# Patient Record
Sex: FEMALE | Race: BLACK OR AFRICAN AMERICAN | NOT HISPANIC OR LATINO | Employment: OTHER | URBAN - METROPOLITAN AREA
[De-identification: names, ages, dates, MRNs, and addresses within clinical notes are randomized per-mention and may not be internally consistent; named-entity substitution may affect disease eponyms.]

---

## 2017-01-10 ENCOUNTER — ALLSCRIPTS OFFICE VISIT (OUTPATIENT)
Dept: OTHER | Facility: OTHER | Age: 42
End: 2017-01-10

## 2017-01-11 ENCOUNTER — APPOINTMENT (OUTPATIENT)
Dept: LAB | Facility: HOSPITAL | Age: 42
End: 2017-01-11
Payer: COMMERCIAL

## 2017-01-11 DIAGNOSIS — N91.2 AMENORRHEA: ICD-10-CM

## 2017-01-11 LAB — HCG SERPL QL: NEGATIVE

## 2017-01-11 PROCEDURE — 84703 CHORIONIC GONADOTROPIN ASSAY: CPT

## 2017-01-11 PROCEDURE — 36415 COLL VENOUS BLD VENIPUNCTURE: CPT

## 2017-01-23 ENCOUNTER — HOSPITAL ENCOUNTER (EMERGENCY)
Facility: HOSPITAL | Age: 42
Discharge: HOME | End: 2017-01-23
Attending: EMERGENCY MEDICINE | Admitting: EMERGENCY MEDICINE
Payer: COMMERCIAL

## 2017-01-23 ENCOUNTER — APPOINTMENT (EMERGENCY)
Dept: CT IMAGING | Facility: HOSPITAL | Age: 42
End: 2017-01-23
Payer: COMMERCIAL

## 2017-01-23 VITALS
DIASTOLIC BLOOD PRESSURE: 87 MMHG | WEIGHT: 250.66 LBS | OXYGEN SATURATION: 100 % | SYSTOLIC BLOOD PRESSURE: 176 MMHG | RESPIRATION RATE: 18 BRPM | BODY MASS INDEX: 38.11 KG/M2 | TEMPERATURE: 98.2 F | HEART RATE: 72 BPM

## 2017-01-23 DIAGNOSIS — S16.1XXA NECK STRAIN: ICD-10-CM

## 2017-01-23 DIAGNOSIS — R51.9 HEADACHE: Primary | ICD-10-CM

## 2017-01-23 LAB
ALBUMIN SERPL BCP-MCNC: 3.4 G/DL (ref 3.5–5)
ALP SERPL-CCNC: 97 U/L (ref 46–116)
ALT SERPL W P-5'-P-CCNC: 34 U/L (ref 12–78)
AMPHETAMINES SERPL QL SCN: NEGATIVE
ANION GAP SERPL CALCULATED.3IONS-SCNC: 7 MMOL/L (ref 4–13)
APTT PPP: 32 SECONDS (ref 24–36)
AST SERPL W P-5'-P-CCNC: 17 U/L (ref 5–45)
ATRIAL RATE: 68 BPM
BACTERIA UR QL AUTO: ABNORMAL /HPF
BARBITURATES UR QL: NEGATIVE
BASOPHILS # BLD AUTO: 0.05 THOUSANDS/ΜL (ref 0–0.1)
BASOPHILS NFR BLD AUTO: 1 % (ref 0–1)
BENZODIAZ UR QL: NEGATIVE
BILIRUB SERPL-MCNC: 0.16 MG/DL (ref 0.2–1)
BILIRUB UR QL STRIP: NEGATIVE
BUN SERPL-MCNC: 8 MG/DL (ref 5–25)
CALCIUM SERPL-MCNC: 8.7 MG/DL (ref 8.3–10.1)
CHLORIDE SERPL-SCNC: 109 MMOL/L (ref 100–108)
CLARITY UR: CLEAR
CO2 SERPL-SCNC: 24 MMOL/L (ref 21–32)
COCAINE UR QL: NEGATIVE
COLOR UR: ABNORMAL
CREAT SERPL-MCNC: 0.74 MG/DL (ref 0.6–1.3)
EOSINOPHIL # BLD AUTO: 0.17 THOUSAND/ΜL (ref 0–0.61)
EOSINOPHIL NFR BLD AUTO: 4 % (ref 0–6)
ERYTHROCYTE [DISTWIDTH] IN BLOOD BY AUTOMATED COUNT: 21 % (ref 11.6–15.1)
ETHANOL EXG-MCNC: 0 MG/DL
GFR SERPL CREATININE-BSD FRML MDRD: >60 ML/MIN/1.73SQ M
GLUCOSE SERPL-MCNC: 100 MG/DL (ref 65–140)
GLUCOSE UR STRIP-MCNC: NEGATIVE MG/DL
HCG UR QL: NEGATIVE
HCT VFR BLD AUTO: 28.9 % (ref 34.8–46.1)
HGB BLD-MCNC: 8.1 G/DL (ref 11.5–15.4)
HGB UR QL STRIP.AUTO: ABNORMAL
INR PPP: 1.08 (ref 0.86–1.16)
KETONES UR STRIP-MCNC: NEGATIVE MG/DL
LEUKOCYTE ESTERASE UR QL STRIP: NEGATIVE
LYMPHOCYTES # BLD AUTO: 1.8 THOUSANDS/ΜL (ref 0.6–4.47)
LYMPHOCYTES NFR BLD AUTO: 40 % (ref 14–44)
MCH RBC QN AUTO: 19.3 PG (ref 26.8–34.3)
MCHC RBC AUTO-ENTMCNC: 28 G/DL (ref 31.4–37.4)
MCV RBC AUTO: 69 FL (ref 82–98)
METHADONE UR QL: NEGATIVE
MONOCYTES # BLD AUTO: 0.39 THOUSAND/ΜL (ref 0.17–1.22)
MONOCYTES NFR BLD AUTO: 9 % (ref 4–12)
NEUTROPHILS # BLD AUTO: 2.06 THOUSANDS/ΜL (ref 1.85–7.62)
NEUTS SEG NFR BLD AUTO: 46 % (ref 43–75)
NITRITE UR QL STRIP: NEGATIVE
NON-SQ EPI CELLS URNS QL MICRO: ABNORMAL /HPF
OPIATES UR QL SCN: NEGATIVE
P AXIS: 27 DEGREES
PCP UR QL: NEGATIVE
PH UR STRIP.AUTO: 7 [PH] (ref 4.5–8)
PLATELET # BLD AUTO: 654 THOUSANDS/UL (ref 149–390)
PMV BLD AUTO: 10.7 FL (ref 8.9–12.7)
POTASSIUM SERPL-SCNC: 3.7 MMOL/L (ref 3.5–5.3)
PR INTERVAL: 216 MS
PROT SERPL-MCNC: 7.3 G/DL (ref 6.4–8.2)
PROT UR STRIP-MCNC: NEGATIVE MG/DL
PROTHROMBIN TIME: 14 SECONDS (ref 12–14.3)
QRS AXIS: 53 DEGREES
QRSD INTERVAL: 84 MS
QT INTERVAL: 384 MS
QTC INTERVAL: 408 MS
RBC # BLD AUTO: 4.19 MILLION/UL (ref 3.81–5.12)
RBC #/AREA URNS AUTO: ABNORMAL /HPF
SODIUM SERPL-SCNC: 140 MMOL/L (ref 136–145)
SP GR UR STRIP.AUTO: 1.01 (ref 1–1.03)
SPECIMEN SOURCE: NORMAL
T WAVE AXIS: 3 DEGREES
THC UR QL: POSITIVE
TROPONIN I BLD-MCNC: 0.01 NG/ML (ref 0–0.08)
UROBILINOGEN UR QL STRIP.AUTO: 1 E.U./DL
VENTRICULAR RATE: 68 BPM
WBC # BLD AUTO: 4.47 THOUSAND/UL (ref 4.31–10.16)
WBC #/AREA URNS AUTO: ABNORMAL /HPF

## 2017-01-23 PROCEDURE — 80053 COMPREHEN METABOLIC PANEL: CPT | Performed by: EMERGENCY MEDICINE

## 2017-01-23 PROCEDURE — 85730 THROMBOPLASTIN TIME PARTIAL: CPT | Performed by: EMERGENCY MEDICINE

## 2017-01-23 PROCEDURE — 81002 URINALYSIS NONAUTO W/O SCOPE: CPT | Performed by: EMERGENCY MEDICINE

## 2017-01-23 PROCEDURE — 85610 PROTHROMBIN TIME: CPT | Performed by: EMERGENCY MEDICINE

## 2017-01-23 PROCEDURE — 82075 ASSAY OF BREATH ETHANOL: CPT | Performed by: EMERGENCY MEDICINE

## 2017-01-23 PROCEDURE — 81025 URINE PREGNANCY TEST: CPT | Performed by: EMERGENCY MEDICINE

## 2017-01-23 PROCEDURE — 85025 COMPLETE CBC W/AUTO DIFF WBC: CPT | Performed by: EMERGENCY MEDICINE

## 2017-01-23 PROCEDURE — 96374 THER/PROPH/DIAG INJ IV PUSH: CPT

## 2017-01-23 PROCEDURE — 93005 ELECTROCARDIOGRAM TRACING: CPT | Performed by: EMERGENCY MEDICINE

## 2017-01-23 PROCEDURE — 80307 DRUG TEST PRSMV CHEM ANLYZR: CPT | Performed by: EMERGENCY MEDICINE

## 2017-01-23 PROCEDURE — 70496 CT ANGIOGRAPHY HEAD: CPT

## 2017-01-23 PROCEDURE — 36415 COLL VENOUS BLD VENIPUNCTURE: CPT | Performed by: EMERGENCY MEDICINE

## 2017-01-23 PROCEDURE — 70498 CT ANGIOGRAPHY NECK: CPT

## 2017-01-23 PROCEDURE — 84484 ASSAY OF TROPONIN QUANT: CPT

## 2017-01-23 PROCEDURE — 87086 URINE CULTURE/COLONY COUNT: CPT

## 2017-01-23 PROCEDURE — 99284 EMERGENCY DEPT VISIT MOD MDM: CPT

## 2017-01-23 PROCEDURE — 81001 URINALYSIS AUTO W/SCOPE: CPT

## 2017-01-23 RX ORDER — TIZANIDINE HYDROCHLORIDE 4 MG/1
4 CAPSULE, GELATIN COATED ORAL
COMMUNITY

## 2017-01-23 RX ORDER — DIPHENHYDRAMINE HYDROCHLORIDE 50 MG/ML
50 INJECTION INTRAMUSCULAR; INTRAVENOUS ONCE
Status: COMPLETED | OUTPATIENT
Start: 2017-01-23 | End: 2017-01-23

## 2017-01-23 RX ADMIN — IOHEXOL 85 ML: 350 INJECTION, SOLUTION INTRAVENOUS at 16:09

## 2017-01-23 RX ADMIN — DIPHENHYDRAMINE HYDROCHLORIDE 50 MG: 50 INJECTION, SOLUTION INTRAMUSCULAR; INTRAVENOUS at 15:44

## 2017-01-24 LAB — BACTERIA UR CULT: NORMAL

## 2017-01-27 ENCOUNTER — ALLSCRIPTS OFFICE VISIT (OUTPATIENT)
Dept: OTHER | Facility: OTHER | Age: 42
End: 2017-01-27

## 2017-01-27 DIAGNOSIS — D50.9 IRON DEFICIENCY ANEMIA: ICD-10-CM

## 2017-01-27 DIAGNOSIS — N93.8 OTHER SPECIFIED ABNORMAL UTERINE AND VAGINAL BLEEDING: ICD-10-CM

## 2017-01-27 DIAGNOSIS — Z12.31 ENCOUNTER FOR SCREENING MAMMOGRAM FOR MALIGNANT NEOPLASM OF BREAST: ICD-10-CM

## 2017-01-27 DIAGNOSIS — N92.0 EXCESSIVE AND FREQUENT MENSTRUATION WITH REGULAR CYCLE: ICD-10-CM

## 2017-01-27 DIAGNOSIS — F10.10 UNCOMPLICATED ALCOHOL ABUSE: ICD-10-CM

## 2017-01-27 DIAGNOSIS — E53.8 DEFICIENCY OF OTHER SPECIFIED B GROUP VITAMINS: ICD-10-CM

## 2017-01-27 DIAGNOSIS — G35 MULTIPLE SCLEROSIS (HCC): ICD-10-CM

## 2017-01-30 RX ORDER — CYANOCOBALAMIN 1000 UG/ML
1000 INJECTION INTRAMUSCULAR; SUBCUTANEOUS ONCE
Status: DISCONTINUED | OUTPATIENT
Start: 2017-01-31 | End: 2017-02-01

## 2017-01-30 RX ORDER — SODIUM CHLORIDE 9 MG/ML
20 INJECTION, SOLUTION INTRAVENOUS ONCE
Status: DISCONTINUED | OUTPATIENT
Start: 2017-01-31 | End: 2017-02-01

## 2017-01-31 ENCOUNTER — HOSPITAL ENCOUNTER (OUTPATIENT)
Dept: INFUSION CENTER | Facility: HOSPITAL | Age: 42
Discharge: HOME | End: 2017-01-31
Payer: COMMERCIAL

## 2017-02-01 ENCOUNTER — HOSPITAL ENCOUNTER (OUTPATIENT)
Dept: INFUSION CENTER | Facility: HOSPITAL | Age: 42
Discharge: HOME | End: 2017-02-01
Payer: COMMERCIAL

## 2017-02-01 VITALS
WEIGHT: 246.03 LBS | DIASTOLIC BLOOD PRESSURE: 62 MMHG | TEMPERATURE: 96.7 F | SYSTOLIC BLOOD PRESSURE: 118 MMHG | HEART RATE: 72 BPM | BODY MASS INDEX: 36.44 KG/M2 | RESPIRATION RATE: 20 BRPM | HEIGHT: 69 IN

## 2017-02-01 PROCEDURE — 96365 THER/PROPH/DIAG IV INF INIT: CPT

## 2017-02-01 PROCEDURE — 96366 THER/PROPH/DIAG IV INF ADDON: CPT

## 2017-02-01 PROCEDURE — 96372 THER/PROPH/DIAG INJ SC/IM: CPT

## 2017-02-01 RX ORDER — SODIUM CHLORIDE 9 MG/ML
20 INJECTION, SOLUTION INTRAVENOUS ONCE
Status: COMPLETED | OUTPATIENT
Start: 2017-02-01 | End: 2017-02-01

## 2017-02-01 RX ORDER — CYANOCOBALAMIN 1000 UG/ML
1000 INJECTION INTRAMUSCULAR; SUBCUTANEOUS ONCE
Status: COMPLETED | OUTPATIENT
Start: 2017-02-01 | End: 2017-02-01

## 2017-02-01 RX ORDER — METHOCARBAMOL 750 MG/1
750 TABLET, FILM COATED ORAL 3 TIMES DAILY
COMMUNITY

## 2017-02-01 RX ADMIN — SODIUM CHLORIDE 20 ML/HR: 0.9 INJECTION, SOLUTION INTRAVENOUS at 10:19

## 2017-02-01 RX ADMIN — IRON SUCROSE 300 MG: 20 INJECTION, SOLUTION INTRAVENOUS at 09:50

## 2017-02-01 RX ADMIN — CYANOCOBALAMIN 1000 MCG: 1000 INJECTION, SOLUTION INTRAMUSCULAR at 10:24

## 2017-02-01 NOTE — PLAN OF CARE
Problem: Potential for Falls  Goal: Patient will remain free of falls  INTERVENTIONS:  - Assess patient frequently for physical needs  - Identify cognitive and physical deficits and behaviors that affect risk of falls    - [place] fall precautions as indicated by assessment   - Educate patient/family on patient safety including physical limitations  - Instruct patient to call for assistance with activity based on assessment  - Modify environment to reduce risk of injury  - Consider OT/PT consult to assist with strengthening/mobility   Outcome: Progressing

## 2017-02-03 ENCOUNTER — ALLSCRIPTS OFFICE VISIT (OUTPATIENT)
Dept: OTHER | Facility: OTHER | Age: 42
End: 2017-02-03

## 2017-02-06 ENCOUNTER — ALLSCRIPTS OFFICE VISIT (OUTPATIENT)
Dept: OTHER | Facility: OTHER | Age: 42
End: 2017-02-06

## 2017-02-06 PROCEDURE — 87624 HPV HI-RISK TYP POOLED RSLT: CPT | Performed by: NURSE PRACTITIONER

## 2017-02-06 PROCEDURE — G0145 SCR C/V CYTO,THINLAYER,RESCR: HCPCS | Performed by: NURSE PRACTITIONER

## 2017-02-06 RX ORDER — SODIUM CHLORIDE 9 MG/ML
20 INJECTION, SOLUTION INTRAVENOUS ONCE
Status: COMPLETED | OUTPATIENT
Start: 2017-02-07 | End: 2017-02-07

## 2017-02-06 RX ORDER — CYANOCOBALAMIN 1000 UG/ML
1000 INJECTION INTRAMUSCULAR; SUBCUTANEOUS ONCE
Status: COMPLETED | OUTPATIENT
Start: 2017-02-07 | End: 2017-02-07

## 2017-02-07 ENCOUNTER — LAB REQUISITION (OUTPATIENT)
Dept: LAB | Facility: HOSPITAL | Age: 42
End: 2017-02-07
Payer: COMMERCIAL

## 2017-02-07 ENCOUNTER — HOSPITAL ENCOUNTER (OUTPATIENT)
Dept: INFUSION CENTER | Facility: HOSPITAL | Age: 42
Discharge: HOME | End: 2017-02-07
Payer: COMMERCIAL

## 2017-02-07 VITALS
DIASTOLIC BLOOD PRESSURE: 72 MMHG | RESPIRATION RATE: 18 BRPM | HEART RATE: 78 BPM | SYSTOLIC BLOOD PRESSURE: 118 MMHG | TEMPERATURE: 98.9 F

## 2017-02-07 DIAGNOSIS — Z11.51 ENCOUNTER FOR SCREENING FOR HUMAN PAPILLOMAVIRUS (HPV): ICD-10-CM

## 2017-02-07 DIAGNOSIS — Z77.21 CONTACT WITH AND (SUSPECTED) EXPOSURE TO POTENTIALLY HAZARDOUS BODY FLUIDS: ICD-10-CM

## 2017-02-07 DIAGNOSIS — Z01.419 ENCOUNTER FOR GYNECOLOGICAL EXAMINATION WITHOUT ABNORMAL FINDING: ICD-10-CM

## 2017-02-07 DIAGNOSIS — Z11.3 ENCOUNTER FOR SCREENING FOR INFECTIONS WITH PREDOMINANTLY SEXUAL MODE OF TRANSMISSION: ICD-10-CM

## 2017-02-07 PROCEDURE — 96372 THER/PROPH/DIAG INJ SC/IM: CPT

## 2017-02-07 PROCEDURE — 87491 CHLMYD TRACH DNA AMP PROBE: CPT | Performed by: NURSE PRACTITIONER

## 2017-02-07 PROCEDURE — 96365 THER/PROPH/DIAG IV INF INIT: CPT

## 2017-02-07 PROCEDURE — 87591 N.GONORRHOEAE DNA AMP PROB: CPT | Performed by: NURSE PRACTITIONER

## 2017-02-07 PROCEDURE — 96366 THER/PROPH/DIAG IV INF ADDON: CPT

## 2017-02-07 RX ADMIN — SODIUM CHLORIDE 20 ML/HR: 0.9 INJECTION, SOLUTION INTRAVENOUS at 13:30

## 2017-02-07 RX ADMIN — CYANOCOBALAMIN 1000 MCG: 1000 INJECTION, SOLUTION INTRAMUSCULAR at 15:41

## 2017-02-07 RX ADMIN — IRON SUCROSE 300 MG: 20 INJECTION, SOLUTION INTRAVENOUS at 13:33

## 2017-02-08 ENCOUNTER — TRANSCRIBE ORDERS (OUTPATIENT)
Dept: ADMINISTRATIVE | Facility: HOSPITAL | Age: 42
End: 2017-02-08

## 2017-02-08 ENCOUNTER — ALLSCRIPTS OFFICE VISIT (OUTPATIENT)
Dept: OTHER | Facility: OTHER | Age: 42
End: 2017-02-08

## 2017-02-08 DIAGNOSIS — R55 SYNCOPE AND COLLAPSE: ICD-10-CM

## 2017-02-08 DIAGNOSIS — G47.33 OBSTRUCTIVE SLEEP APNEA (ADULT) (PEDIATRIC): Primary | ICD-10-CM

## 2017-02-08 DIAGNOSIS — G35 MULTIPLE SCLEROSIS (HCC): ICD-10-CM

## 2017-02-08 LAB
CHLAMYDIA DNA CVX QL NAA+PROBE: NORMAL
N GONORRHOEA DNA GENITAL QL NAA+PROBE: NORMAL

## 2017-02-13 ENCOUNTER — LAB (OUTPATIENT)
Dept: LAB | Facility: HOSPITAL | Age: 42
End: 2017-02-13
Payer: COMMERCIAL

## 2017-02-13 DIAGNOSIS — I26.99 OTHER PULMONARY EMBOLISM WITHOUT ACUTE COR PULMONALE (HCC): ICD-10-CM

## 2017-02-13 DIAGNOSIS — N92.0 EXCESSIVE AND FREQUENT MENSTRUATION WITH REGULAR CYCLE: ICD-10-CM

## 2017-02-13 DIAGNOSIS — E53.8 DEFICIENCY OF OTHER SPECIFIED B GROUP VITAMINS: ICD-10-CM

## 2017-02-13 DIAGNOSIS — G35 MULTIPLE SCLEROSIS (HCC): ICD-10-CM

## 2017-02-13 LAB
BUN SERPL-MCNC: 10 MG/DL (ref 5–25)
CREAT SERPL-MCNC: 0.68 MG/DL (ref 0.6–1.3)
FOLATE SERPL-MCNC: 10.3 NG/ML (ref 3.1–17.5)
GFR SERPL CREATININE-BSD FRML MDRD: >60 ML/MIN/1.73SQ M
INR PPP: 1.6 (ref 0.86–1.16)
PROTHROMBIN TIME: 19 SECONDS (ref 12–14.3)
TSH SERPL DL<=0.05 MIU/L-ACNC: 0.57 UIU/ML (ref 0.36–3.74)
VIT B12 SERPL-MCNC: 598 PG/ML (ref 100–900)

## 2017-02-13 PROCEDURE — 82607 VITAMIN B-12: CPT

## 2017-02-13 PROCEDURE — 86235 NUCLEAR ANTIGEN ANTIBODY: CPT

## 2017-02-13 PROCEDURE — 82565 ASSAY OF CREATININE: CPT

## 2017-02-13 PROCEDURE — 84443 ASSAY THYROID STIM HORMONE: CPT

## 2017-02-13 PROCEDURE — 86618 LYME DISEASE ANTIBODY: CPT

## 2017-02-13 PROCEDURE — 82746 ASSAY OF FOLIC ACID SERUM: CPT

## 2017-02-13 PROCEDURE — 85610 PROTHROMBIN TIME: CPT

## 2017-02-13 PROCEDURE — 84520 ASSAY OF UREA NITROGEN: CPT

## 2017-02-13 PROCEDURE — 84207 ASSAY OF VITAMIN B-6: CPT

## 2017-02-13 PROCEDURE — 36415 COLL VENOUS BLD VENIPUNCTURE: CPT

## 2017-02-13 RX ORDER — CYANOCOBALAMIN 1000 UG/ML
1000 INJECTION INTRAMUSCULAR; SUBCUTANEOUS ONCE
Status: DISCONTINUED | OUTPATIENT
Start: 2017-02-14 | End: 2017-02-17 | Stop reason: HOSPADM

## 2017-02-13 RX ORDER — SODIUM CHLORIDE 9 MG/ML
20 INJECTION, SOLUTION INTRAVENOUS ONCE
Status: DISCONTINUED | OUTPATIENT
Start: 2017-02-14 | End: 2017-02-17 | Stop reason: HOSPADM

## 2017-02-14 ENCOUNTER — HOSPITAL ENCOUNTER (OUTPATIENT)
Dept: INFUSION CENTER | Facility: HOSPITAL | Age: 42
Discharge: HOME | End: 2017-02-14
Payer: COMMERCIAL

## 2017-02-14 LAB
ENA SS-A AB SER-ACNC: <0.2 AI (ref 0–0.9)
ENA SS-B AB SER-ACNC: <0.2 AI (ref 0–0.9)

## 2017-02-15 ENCOUNTER — HOSPITAL ENCOUNTER (OUTPATIENT)
Dept: RADIOLOGY | Facility: HOSPITAL | Age: 42
Discharge: HOME | End: 2017-02-15
Payer: COMMERCIAL

## 2017-02-15 DIAGNOSIS — N92.0 EXCESSIVE AND FREQUENT MENSTRUATION WITH REGULAR CYCLE: ICD-10-CM

## 2017-02-15 LAB
B BURGDOR IGG SER IA-ACNC: 0.36
B BURGDOR IGM SER IA-ACNC: 0.17
HPV RRNA GENITAL QL NAA+PROBE: ABNORMAL

## 2017-02-15 PROCEDURE — 76830 TRANSVAGINAL US NON-OB: CPT

## 2017-02-15 PROCEDURE — 76856 US EXAM PELVIC COMPLETE: CPT

## 2017-02-16 ENCOUNTER — GENERIC CONVERSION - ENCOUNTER (OUTPATIENT)
Dept: OTHER | Facility: OTHER | Age: 42
End: 2017-02-16

## 2017-02-16 ENCOUNTER — HOSPITAL ENCOUNTER (OUTPATIENT)
Dept: RADIOLOGY | Facility: HOSPITAL | Age: 42
End: 2017-02-16
Payer: COMMERCIAL

## 2017-02-16 LAB
LAB AP GYN PRIMARY INTERPRETATION: NORMAL
LAB AP LMP: NORMAL
Lab: NORMAL
VIT B6 SERPL-MCNC: 9.6 UG/L (ref 2–32.8)

## 2017-03-01 DIAGNOSIS — Z95.828 PRESENCE OF OTHER VASCULAR IMPLANTS AND GRAFTS: ICD-10-CM

## 2017-03-02 ENCOUNTER — TRANSCRIBE ORDERS (OUTPATIENT)
Dept: ADMINISTRATIVE | Facility: HOSPITAL | Age: 42
End: 2017-03-02

## 2017-03-02 DIAGNOSIS — I80.9 BLOOD CLOT ASSOCIATED WITH VEIN WALL INFLAMMATION: Primary | ICD-10-CM

## 2017-03-03 ENCOUNTER — GENERIC CONVERSION - ENCOUNTER (OUTPATIENT)
Dept: OTHER | Facility: OTHER | Age: 42
End: 2017-03-03

## 2017-03-08 ENCOUNTER — HOSPITAL ENCOUNTER (OUTPATIENT)
Dept: RADIOLOGY | Facility: HOSPITAL | Age: 42
Discharge: HOME | End: 2017-03-08
Payer: COMMERCIAL

## 2017-03-08 DIAGNOSIS — I80.9 BLOOD CLOT ASSOCIATED WITH VEIN WALL INFLAMMATION: ICD-10-CM

## 2017-03-08 PROCEDURE — 71250 CT THORAX DX C-: CPT

## 2017-03-08 PROCEDURE — 74150 CT ABDOMEN W/O CONTRAST: CPT

## 2017-03-10 ENCOUNTER — GENERIC CONVERSION - ENCOUNTER (OUTPATIENT)
Dept: OTHER | Facility: OTHER | Age: 42
End: 2017-03-10

## 2017-03-10 RX ORDER — SODIUM CHLORIDE 9 MG/ML
20 INJECTION, SOLUTION INTRAVENOUS ONCE
Status: COMPLETED | OUTPATIENT
Start: 2017-03-13 | End: 2017-03-13

## 2017-03-10 RX ORDER — CYANOCOBALAMIN 1000 UG/ML
1000 INJECTION INTRAMUSCULAR; SUBCUTANEOUS ONCE
Status: COMPLETED | OUTPATIENT
Start: 2017-03-13 | End: 2017-03-13

## 2017-03-13 ENCOUNTER — HOSPITAL ENCOUNTER (OUTPATIENT)
Dept: INFUSION CENTER | Facility: HOSPITAL | Age: 42
Discharge: HOME | End: 2017-03-13
Payer: COMMERCIAL

## 2017-03-13 VITALS
HEART RATE: 90 BPM | SYSTOLIC BLOOD PRESSURE: 104 MMHG | DIASTOLIC BLOOD PRESSURE: 62 MMHG | RESPIRATION RATE: 18 BRPM | TEMPERATURE: 97.4 F

## 2017-03-13 PROCEDURE — 96365 THER/PROPH/DIAG IV INF INIT: CPT

## 2017-03-13 PROCEDURE — 96372 THER/PROPH/DIAG INJ SC/IM: CPT

## 2017-03-13 PROCEDURE — 96366 THER/PROPH/DIAG IV INF ADDON: CPT

## 2017-03-13 RX ADMIN — CYANOCOBALAMIN 1000 MCG: 1000 INJECTION, SOLUTION INTRAMUSCULAR at 14:44

## 2017-03-13 RX ADMIN — SODIUM CHLORIDE 20 ML/HR: 0.9 INJECTION, SOLUTION INTRAVENOUS at 14:39

## 2017-03-13 RX ADMIN — IRON SUCROSE 300 MG: 20 INJECTION, SOLUTION INTRAVENOUS at 14:39

## 2017-03-13 NOTE — PLAN OF CARE
Problem: Potential for Falls  Goal: Patient will remain free of falls  INTERVENTIONS:  - Assess patient frequently for physical needs  - Identify cognitive and physical deficits and behaviors that affect risk of falls    - [place] fall precautions as indicated by assessment   - Educate patient/family on patient safety including physical limitations  - Instruct patient to call for assistance with activity based on assessment  - Modify environment to reduce risk of injury  - Consider OT/PT consult to assist with strengthening/mobility   Outcome: Progressing

## 2017-03-17 RX ORDER — SODIUM CHLORIDE 9 MG/ML
20 INJECTION, SOLUTION INTRAVENOUS ONCE
Status: COMPLETED | OUTPATIENT
Start: 2017-03-20 | End: 2017-03-20

## 2017-03-17 RX ORDER — CYANOCOBALAMIN 1000 UG/ML
1000 INJECTION INTRAMUSCULAR; SUBCUTANEOUS ONCE
Status: COMPLETED | OUTPATIENT
Start: 2017-03-20 | End: 2017-03-20

## 2017-03-20 ENCOUNTER — HOSPITAL ENCOUNTER (OUTPATIENT)
Dept: INFUSION CENTER | Facility: HOSPITAL | Age: 42
Discharge: HOME | End: 2017-03-20
Payer: COMMERCIAL

## 2017-03-20 ENCOUNTER — GENERIC CONVERSION - ENCOUNTER (OUTPATIENT)
Dept: OTHER | Facility: OTHER | Age: 42
End: 2017-03-20

## 2017-03-20 VITALS
SYSTOLIC BLOOD PRESSURE: 121 MMHG | TEMPERATURE: 98 F | RESPIRATION RATE: 18 BRPM | DIASTOLIC BLOOD PRESSURE: 71 MMHG | HEART RATE: 105 BPM

## 2017-03-20 PROCEDURE — 96366 THER/PROPH/DIAG IV INF ADDON: CPT

## 2017-03-20 PROCEDURE — 96365 THER/PROPH/DIAG IV INF INIT: CPT

## 2017-03-20 PROCEDURE — 96372 THER/PROPH/DIAG INJ SC/IM: CPT

## 2017-03-20 RX ORDER — PREGABALIN 50 MG/1
50 CAPSULE ORAL 3 TIMES DAILY
COMMUNITY

## 2017-03-20 RX ORDER — WARFARIN SODIUM 10 MG/1
15 TABLET ORAL
COMMUNITY

## 2017-03-20 RX ORDER — ESCITALOPRAM OXALATE 10 MG/1
10 TABLET ORAL DAILY
COMMUNITY

## 2017-03-20 RX ORDER — LANOLIN ALCOHOL/MO/W.PET/CERES
100 CREAM (GRAM) TOPICAL DAILY
COMMUNITY

## 2017-03-20 RX ADMIN — SODIUM CHLORIDE 20 ML/HR: 0.9 INJECTION, SOLUTION INTRAVENOUS at 13:19

## 2017-03-20 RX ADMIN — IRON SUCROSE 300 MG: 20 INJECTION, SOLUTION INTRAVENOUS at 13:39

## 2017-03-20 RX ADMIN — CYANOCOBALAMIN 1000 MCG: 1000 INJECTION, SOLUTION INTRAMUSCULAR at 13:18

## 2017-03-20 NOTE — PLAN OF CARE
Problem: Potential for Falls  Goal: Patient will remain free of falls  INTERVENTIONS:  - Assess patient frequently for physical needs  - Identify cognitive and physical deficits and behaviors that affect risk of falls    - [place] fall precautions as indicated by assessment   - Educate patient/family on patient safety including physical limitations  - Instruct patient to call for assistance with activity based on assessment  - Modify environment to reduce risk of injury  - Consider OT/PT consult to assist with strengthening/mobility   Outcome: Progressing

## 2017-05-11 ENCOUNTER — GENERIC CONVERSION - ENCOUNTER (OUTPATIENT)
Dept: OTHER | Facility: OTHER | Age: 42
End: 2017-05-11

## 2018-01-10 NOTE — MISCELLANEOUS
Provider Comments  Provider Comments:   Patient had second No show No call Neurology appointment  Tried to call patient to reschedule but got no answer  Sent second no show letter to address on file  Minerva GILES      Signatures   Electronically signed by : Ivone Silva;  Aug 31 2016 11:57AM EST                       (Author)

## 2018-01-10 NOTE — MISCELLANEOUS
Reason For Visit  Reason For Visit Free Text Note Form: F/U calls to pt an sister to assist with housing crisis  Pt may need to leave the hotel they are staying  Pt has met with [facility] who are trying to assist pt as able  Anita has provided pt with # for  resource near her with [facility]  Pt to call and try and schedule  Pt also requested her wheelchair to be delivered to the hotel room  SW did request [address] to deliver same this date as pt was having falls  SW was also able to help pt to get schedule pt with [facility] the organization who does Cara Hernandez evaluations  THis service ends tomorrow  She is scheduled at 8:00 am for tomorrow  Pt is aware  Pt relates she is also to see her PCP later that day   Pt to set up that transportation  Referral for [facility] also faxed in  Sw has also reviewed with pt shelter option as well as possible SNF placement since it had been recommended prior to DC  Pt has the [facility] who does direct admissions  Sw to continue to follow and assist as indicated  Active Problems    1  Asthma (493 90) (J45 909)   2  Benign essential hypertension (401 1) (I10)   3  Chronic GERD (530 81) (K21 9)   4  Constipation (564 00) (K59 00)   5  Deep venous thrombosis of distal lower extremity (453 42) (I82 4Z9)   6  Diabetes mellitus, type 2 (250 00) (E11 9)   7  Encounter for monitoring coumadin therapy (V58 83,V58 61) (Z51 81,Z79 01)   8  HIT (heparin-induced thrombocytopenia) (289 84) (D75 82)   9  Iron deficiency anemia (280 9) (D50 9)   10  Itching (698 9) (L29 9)   11  Migraine headache (346 90) (G43 909)   12  Morbid obesity (278 01) (E66 01)   13  Multiple sclerosis (340) (G35)   14  Neuropathic pain (729 2) (M79 2)   15  Peripheral neuropathy (356 9) (G62 9)   16  Pulmonary embolism (415 19) (I26 99)   17  Temporary low platelet count (389 4) (D69 6)   18  Urinary incontinence, unspecified type (788 30) (R32)   19  Vertigo (780 4) (R42)   20  Vitamin B12 deficiency (266 2) (E53 8)    Current Meds   1  Baclofen 20 MG Oral Tablet; TAKE 1 TAB PO QHS; Therapy: 12Apr2016 to (Evaluate:10Aug2016); Last Rx:12Apr2016 Ordered   2  Benadryl 25 MG Oral Tablet; TAKE 1 TABLET EVERY 4 TO 6 HOURS AS NEEDED; Therapy: 51BRC6676 to (Evaluate:51Hmc5472)  Requested for: 20MFL7704; Last   Rx:29Mar2016 Ordered   3  Butalbital-APAP-Caffeine -40 MG Oral Capsule; Therapy: 05Apr2016 to Recorded   4  [employer] Vitamin B-12 1000 MCG Oral Tablet; TAKE 1 TABLET DAILY AS DIRECTED; Therapy: 44XBM7010 to (Evaluate:15Oct2016)  Requested for: 29Mar2016; Last   Rx:29Mar2016 Ordered   5  Docusate Sodium 100 MG Oral Capsule; TAKE 1 CAPSULE DAILY; Therapy: 47WEQ7532 to (Evaluate:24Mar2017)  Requested for: 59SFP4020; Last   Rx:29Mar2016 Ordered   6  Ferrous Sulfate 325 (65 Fe) MG Oral Tablet; TAKE 1 TABLET 3 TIMES DAILY WITH FOOD; Therapy: 98RID9877 to ([facility])  Requested for: 71DGF1301; Last   Rx:29Mar2016 Ordered   7  Hydrochlorothiazide 12 5 MG Oral Tablet; TAKE 1 TABLET DAILY AS NEEDED; Therapy: 11YOM5240 to ([facility])  Requested for: 13HXK8337; Last   Rx:29Mar2016 Ordered   8  Magnesium Oxide 400 MG Oral Tablet; TAKE 1 TABLET 3 times daily; Therapy: 05Apr2016 to (Adalberto Young) Recorded   9  Meclizine HCl - 25 MG Oral Tablet; TAKE 1 TABLET 3-4 TIMES DAILY AS NEEDED; Therapy: 66WNN8627 to (Evaluate:75Jyi1253)  Requested for: 88OJX4643; Last   Rx:29Mar2016 Ordered   10  Nortriptyline HCl - 50 MG Oral Capsule; TAKE 1 CAPSULE AT BEDTIME; Therapy: 48JSZ0102 to Recorded   11  Omeprazole 40 MG Oral Capsule Delayed Release; TAKE 1 CAPSULE DAILY; Therapy: 46YFT4326 to (Evaluate:27Jun2016)  Requested for: 10RHY8435; Last    Rx:29Mar2016 Ordered   12  Thiamine HCl - 100 MG Oral Tablet; TAKE 1 TABLET DAILY; Therapy: 05Apr2016 to (Evaluate:82Btf8178) Recorded   13  TiZANidine HCl - 4 MG Oral Tablet; TAKE 1 TABLET AT BEDTIME;     Therapy: 14Apr2016 to (Evaluate:18Mvs9130); Last Rx:14Apr2016 Ordered   14  Topamax 50 MG Oral Tablet (Topiramate); TAKE 1 TABLET DAILY; Therapy: 98WUT8749 to (Charmayne Graven)  Requested for: 71RHA2554; Last    Rx:29Mar2016 Ordered   15  Ventolin  (90 Base) MCG/ACT Inhalation Aerosol Solution; INHALE 1 TO 2 PUFFS    EVERY 4 TO 6 HOURS AS NEEDED; Therapy: 03YOJ4056 to (Evaluate:90Ine1265)  Requested for: 95XDV2384; Last    Rx:29Mar2016 Ordered   16  Vitamin D3 2000 UNIT Oral Capsule; take 1 capsule by mouth once daily; Therapy: 05Apr2016 to Recorded   17  Warfarin Sodium 5 MG Oral Tablet; take as directed  take one and half tablet daily; Therapy: 80SCH4639 to (Last Rx:30Mar2016)  Requested for: 29NVH1909 Ordered    Allergies    1  Iodine External Tincture   2   Sulfa Drugs    Signatures   Electronically signed by : Maura Brown LCSW; Apr 19 2016  1:21PM EST                       (Author)

## 2018-01-10 NOTE — MISCELLANEOUS
Reason For Visit  Reason For Visit Free Text Note Form: SW spoke with pt via phone this date  Pt has been accepted at [facility] and will go there via Lorenzo block/nancy LANDEROS visit this date  SW will remain available to pt and assist as indicated  Active Problems    1  Asthma (493 90) (J45 909)   2  Benign essential hypertension (401 1) (I10)   3  Chronic GERD (530 81) (K21 9)   4  Constipation (564 00) (K59 00)   5  Deep venous thrombosis of distal lower extremity (453 42) (I82 4Z9)   6  Diabetes mellitus, type 2 (250 00) (E11 9)   7  Encounter for monitoring coumadin therapy (V58 83,V58 61) (Z51 81,Z79 01)   8  HIT (heparin-induced thrombocytopenia) (289 84) (D75 82)   9  Iron deficiency anemia (280 9) (D50 9)   10  Itching (698 9) (L29 9)   11  Migraine headache (346 90) (G43 909)   12  Morbid obesity (278 01) (E66 01)   13  Multiple sclerosis (340) (G35)   14  Neuropathic pain (729 2) (M79 2)   15  Peripheral neuropathy (356 9) (G62 9)   16  Pulmonary embolism (415 19) (I26 99)   17  Temporary low platelet count (238 7) (D69 6)   18  Urinary incontinence, unspecified type (788 30) (R32)   19  Vertigo (780 4) (R42)   20  Vitamin B12 deficiency (266 2) (E53 8)    Current Meds   1  Baclofen 20 MG Oral Tablet; TAKE 1 TAB PO QHS; Therapy: 70Wmz2972 to (Evaluate:95Lhs8422); Last Rx:12Apr2016 Ordered   2  Benadryl 25 MG Oral Tablet; TAKE 1 TABLET EVERY 4 TO 6 HOURS AS NEEDED; Therapy: 74BUY6826 to (Evaluate:22Hym3835)  Requested for: 30ULW1969; Last   Rx:29Mar2016 Ordered   3  Butalbital-APAP-Caffeine -40 MG Oral Capsule; Therapy: 19Kvv2738 to Recorded   4  [employer] Vitamin B-12 1000 MCG Oral Tablet; TAKE 1 TABLET DAILY AS DIRECTED; Therapy: 77KTW2810 to (Evaluate:15Oct2016)  Requested for: 29Mar2016; Last   Rx:29Mar2016 Ordered   5  DiphenhydrAMINE HCl - 50 MG Oral Capsule; take 1 capsule 1hr before contrast;   Therapy: 22Apr2016 to (Last Rx:22Apr2016) Ordered   6   Docusate Sodium 100 MG Oral Capsule; TAKE 1 CAPSULE DAILY; Therapy: 32OKS1022 to (Evaluate:24Mar2017)  Requested for: 09TEE3532; Last   Rx:29Mar2016 Ordered   7  Ferrous Sulfate 325 (65 Fe) MG Oral Tablet; TAKE 1 TABLET 3 TIMES DAILY WITH FOOD; Therapy: 14THW5072 to (Shea Albright)  Requested for: 34YXN5819; Last   Rx:29Mar2016 Ordered   8  Hydrochlorothiazide 12 5 MG Oral Tablet; TAKE 1 TABLET DAILY AS NEEDED; Therapy: 31NMY9413 to (Shea Albright)  Requested for: 45WNZ9790; Last   Rx:29Mar2016 Ordered   9  Magnesium Oxide 400 MG Oral Tablet; TAKE 1 TABLET 3 times daily; Therapy: 05Apr2016 to (Blaze Camargo) Recorded   10  Meclizine HCl - 25 MG Oral Tablet; TAKE 1 TABLET 3-4 TIMES DAILY AS NEEDED; Therapy: 83IZI0865 to (Evaluate:08Yfw8348)  Requested for: 78NFJ7613; Last    Rx:29Mar2016 Ordered   11  MethylPREDNISolone 32 MG Oral Tablet (Medrol); 32 mg po 12 hours prior to MRI, 32mg    PO 2 hours Prior to MRI; Therapy: 26Izf4320 to (Last Rx:22Apr2016) Ordered   12  Nortriptyline HCl - 50 MG Oral Capsule; TAKE 1 CAPSULE AT BEDTIME; Therapy: 39NYG8091 to Recorded   13  Omeprazole 40 MG Oral Capsule Delayed Release; TAKE 1 CAPSULE DAILY; Therapy: 11TAU7925 to (Evaluate:27Jun2016)  Requested for: 29NXN7714; Last    Rx:29Mar2016 Ordered   14  Thiamine HCl - 100 MG Oral Tablet; TAKE 1 TABLET DAILY; Therapy: 05Apr2016 to (Evaluate:50Bxl3644) Recorded   15  TiZANidine HCl - 4 MG Oral Tablet; TAKE 1 TABLET AT BEDTIME; Therapy: 73Vgt5490 to (Evaluate:78Cty8728); Last Rx:14Apr2016 Ordered   16  Topamax 50 MG Oral Tablet (Topiramate); TAKE 1 TABLET DAILY; Therapy: 52GOW2653 to (Shea Albright)  Requested for: 50YKC7393; Last    Rx:29Mar2016 Ordered   17  Ventolin  (90 Base) MCG/ACT Inhalation Aerosol Solution; INHALE 1 TO 2 PUFFS    EVERY 4 TO 6 HOURS AS NEEDED; Therapy: 59KNH2541 to (Evaluate:92Ziq9342)  Requested for: 78DOW6691; Last    Rx:29Mar2016 Ordered   18   Vitamin D3 2000 UNIT Oral Capsule; take 1 capsule by mouth once daily; Therapy: 92SJY3044 to Recorded   19  Warfarin Sodium 5 MG Oral Tablet; take as directed  take one and half tablet daily; Therapy: 38AHV8173 to (Last Rx:30Mar2016)  Requested for: 80EVD4340 Ordered    Allergies    1  Iodine External Tincture   2   Sulfa Drugs    Signatures   Electronically signed by : Siena Paris LCSW; Apr 27 2016 12:32PM EST                       (Author)

## 2018-01-11 NOTE — PROGRESS NOTES
History of Present Illness  Care Coordination Encounter Information:   Type of Encounter: Telephonic   Contact: Initial Contact   Last Office Visit: 3/29/16   Spoke to Patient  Care Coordination [facility] Nurse Lorena Learn:   The reason for call is to discuss outreach for follow up/needed services  Care Coordination call placed to [facility] for condition update  Hospitalized 4/4 - 4/5/16 for with weakness and fall secondary to new diagnosis of M S  in 2/2016  KYRA done and TCM visit was scheduled for 4/20/16 - but appointment with Dr Juanita Shaw was cancelled due to lack of transportation  [facility] reports that she did meet with Venessa Tamayo for the face-to-face application requirement on 4/20/16 but they could not provide transportation the office - approval "may take up to 3 weeks" according to patient  She has an upcoming appointment scheduled with Hem/Onc on Wednesday 4/27/16 and states she has no transportation  I did speak with ANGI Huang for [facility] and made her aware - she will inquire about a possibility of utilizing the [facility] reported that she is left the [employer] and is staying with a friend until 5/1/16  Neither the friend nor her sister can drive her as friend as 6 children and sister has started work with a new employer  She cannot afford taxi as her 2 dogs are being boarded at [facility] at $30 00/day  She is waiting to make appointment with Vestibular therapy and Pain Management once Venessa Tamayo is approved  And she mentioned that Pain Management requires her records from former provider in [place] that she says are not available  Renee Zimmerman also aware that [facility] with housing - despite her having a diagnosis of bipolar disorder, the next available appointment with them is not until September    [facility] did state that she is willing to go to a rehab facility at this time - does not have the contact number for [facility] admissions  [facility] will call patient to facilitate a potential admission to SNF as the patient agrees this will best address the issues of housing and transportation until a more permanent solution can be arranged  And she agrees that she needs physical therapy as she complains of neuropathic pain in hands (despite using bilateral hand braces) that prevents her from using the wheelchair or single cane independently  Stumaury Ganga verified that [facility] [address] visited Saturday and arranged her meds in a pill box  However, when she took her morning pills today, she noticed that Coumadin 15 mg was placed in both the morning and evening section and that she took 30 mg on Saturday and Sunday  The VNA is to visit today and draw a PT/INR - I will notify her PCP the possibility that the results may be elevated due to this issue  When asked about any s/s of bleeding - denies bruising but states she has had some vaginal spotting since Saturday  She reports that the open wound on the L shin has closed but both legs (L > R) remain moderately swollen (despite keeping her legs elevated) with a warm, bruised area mid shin  She also reports increased spasms on the Zanaflex (Baclofen made her feel dizzy)  Rafael Schuler was incontinent of a loose stool on Saturday (and Wednesday - watery) but had bowel control today  Stool remains "mushy" - advised to monitor for worsening s/s and report to PCP - patient understands and in agreement as she informed me that she did have C-diff in the past   Continues with sun sensitivity and migraines despite daily Topamax and Fioricet prn  No worsening but admitted that she was not taking the Topamax daily - the VNA did re-educate her when they arranged her pill box  She feels that her mild depression will subside when her situation remedies and wants to wait before trying a different med from Nortriptyline    I will forward this note to the PCP to advise him of all issues, including the Coumadin and today's PT/INR  I provided Orvan Prom with my contact information and advised her to notify the office of any medical issues  She verbalizes understanding and in agreement with plan  Active Problems    1  Asthma (493 90) (J45 909)   2  Benign essential hypertension (401 1) (I10)   3  Chronic GERD (530 81) (K21 9)   4  Constipation (564 00) (K59 00)   5  Deep venous thrombosis of distal lower extremity (453 42) (I82 4Z9)   6  Diabetes mellitus, type 2 (250 00) (E11 9)   7  Encounter for monitoring coumadin therapy (V58 83,V58 61) (Z51 81,Z79 01)   8  HIT (heparin-induced thrombocytopenia) (289 84) (D75 82)   9  Iron deficiency anemia (280 9) (D50 9)   10  Itching (698 9) (L29 9)   11  Migraine headache (346 90) (G43 909)   12  Morbid obesity (278 01) (E66 01)   13  Multiple sclerosis (340) (G35)   14  Neuropathic pain (729 2) (M79 2)   15  Peripheral neuropathy (356 9) (G62 9)   16  Pulmonary embolism (415 19) (I26 99)   17  Temporary low platelet count (312 5) (D69 6)   18  Urinary incontinence, unspecified type (788 30) (R32)   19  Vertigo (780 4) (R42)   20  Vitamin B12 deficiency (266 2) (E53 8)    Past Medical History    1  History of transient cerebral ischemia (V12 54) (Z86 73)    Surgical History    1  History of Gastric Surgery For Morbid Obesity Gastric Bypass   2  History of Inferior Vena Cava Filter Placement W/ Fluorosc Angiogr Guidance   3  History of Thrombolytics Tissue Plasminogen Activator    Family History  Mother    1  Family history of multiple sclerosis (V17 2) (Z82 0)  Father    2  Family history of cardiac disorder (V17 49) (Z82 49)    Social History    · Engages in binge consumption of alcohol (305 00) (F10 10)   · Former smoker (V15 82) (F18 092)   · Heavy alcohol use (305 00) (Z78 9)   · Lives with family   · Marijuana   · Uses marijuana (305 20) (F12 90)    Current Meds    1   Ventolin  (90 Base) MCG/ACT Inhalation Aerosol Solution; INHALE 1 TO 2 PUFFS   EVERY 4 TO 6 HOURS AS NEEDED; Therapy: 34DPH2339 to (Evaluate:31But7115)  Requested for: 52LFS6533; Last   Rx:29Mar2016 Ordered    2  Hydrochlorothiazide 12 5 MG Oral Tablet; TAKE 1 TABLET DAILY AS NEEDED; Therapy: 52MVS8844 to ([facility])  Requested for: 94DZS0712; Last   Rx:29Mar2016 Ordered    3  Omeprazole 40 MG Oral Capsule Delayed Release; TAKE 1 CAPSULE DAILY; Therapy: 20HLR4794 to (Evaluate:27Jun2016)  Requested for: 29PHZ5096; Last   Rx:29Mar2016 Ordered    4  Docusate Sodium 100 MG Oral Capsule; TAKE 1 CAPSULE DAILY; Therapy: 56HKU3279 to (Evaluate:24Mar2017)  Requested for: 82SPY5925; Last   Rx:29Mar2016 Ordered    5  Warfarin Sodium 5 MG Oral Tablet; take as directed  take one and half tablet daily; Therapy: 12VJY8178 to (Last Rx:30Mar2016)  Requested for: 56TBM4421 Ordered    6  Ferrous Sulfate 325 (65 Fe) MG Oral Tablet; TAKE 1 TABLET 3 TIMES DAILY WITH FOOD; Therapy: 60NBC9474 to ([facility])  Requested for: 47RTI1677; Last   Rx:29Mar2016 Ordered    7  Benadryl 25 MG Oral Tablet; TAKE 1 TABLET EVERY 4 TO 6 HOURS AS NEEDED; Therapy: 72CVB0776 to (Evaluate:10Gii6588)  Requested for: 39CHC6006; Last   Rx:29Mar2016 Ordered    8  Butalbital-APAP-Caffeine -40 MG Oral Capsule; Therapy: 55JOM1076 to Recorded   9  Magnesium Oxide 400 MG Oral Tablet; TAKE 1 TABLET 3 times daily; Therapy: 50Wgx8623 to ([facility]) Recorded   10  Nortriptyline HCl - 50 MG Oral Capsule; TAKE 1 CAPSULE AT BEDTIME; Therapy: 58SRM1070 to Recorded   11  Topamax 50 MG Oral Tablet (Topiramate); TAKE 1 TABLET DAILY; Therapy: 79YLL4082 to ([facility])  Requested for: 39BDG7663; Last    Rx:29Mar2016 Ordered    12  Baclofen 20 MG Oral Tablet; TAKE 1 TAB PO QHS; Therapy: 77Ket5543 to (Evaluate:10Aug2016); Last Rx:12Apr2016 Ordered   13  DiphenhydrAMINE HCl - 50 MG Oral Capsule; take 1 capsule 1hr before contrast;    Therapy: 22Apr2016 to (Last Rx:22Apr2016) Ordered   14   MethylPREDNISolone 32 MG Oral Tablet (Medrol); 32 mg po 12 hours prior to MRI, 32mg    PO 2 hours Prior to MRI; Therapy: 61Ipt5888 to (Last Rx:22Apr2016) Ordered   15  Thiamine HCl - 100 MG Oral Tablet; TAKE 1 TABLET DAILY; Therapy: 57Oep0905 to (Evaluate:71Oie1567) Recorded   16  TiZANidine HCl - 4 MG Oral Tablet; TAKE 1 TABLET AT BEDTIME; Therapy: 91Rqk0351 to (Evaluate:70Ouw8091); Last Rx:14Apr2016 Ordered   17  Vitamin D3 2000 UNIT Oral Capsule; take 1 capsule by mouth once daily; Therapy: 10CWC3107 to Recorded    18  Meclizine HCl - 25 MG Oral Tablet; TAKE 1 TABLET 3-4 TIMES DAILY AS NEEDED; Therapy: 00DAQ7857 to (Evaluate:66Pyc4952)  Requested for: 98MML2356; Last    Rx:29Mar2016 Ordered    19  [employer] Vitamin B-12 1000 MCG Oral Tablet; TAKE 1 TABLET DAILY AS DIRECTED; Therapy: 67XIG1640 to (Evaluate:15Oct2016)  Requested for: 29Mar2016; Last    Rx:29Mar2016 Ordered    Allergies    1  Iodine External Tincture   2  Sulfa Drugs    End of Encounter Meds    1  Ventolin  (90 Base) MCG/ACT Inhalation Aerosol Solution; INHALE 1 TO 2 PUFFS   EVERY 4 TO 6 HOURS AS NEEDED; Therapy: 70FCA0178 to (Evaluate:49Fwm7085)  Requested for: 75KTT8198; Last   Rx:29Mar2016 Ordered    2  Hydrochlorothiazide 12 5 MG Oral Tablet; TAKE 1 TABLET DAILY AS NEEDED; Therapy: 05CGW3252 to (Evan Bennett)  Requested for: 77MLN7387; Last   Rx:29Mar2016 Ordered    3  Omeprazole 40 MG Oral Capsule Delayed Release; TAKE 1 CAPSULE DAILY; Therapy: 58ZWK2196 to (Evaluate:27Jun2016)  Requested for: 89DZB9495; Last   Rx:29Mar2016 Ordered    4  Docusate Sodium 100 MG Oral Capsule; TAKE 1 CAPSULE DAILY; Therapy: 35UZB1640 to (Evaluate:24Mar2017)  Requested for: 07EEN4280; Last   Rx:29Mar2016 Ordered    5  Warfarin Sodium 5 MG Oral Tablet; take as directed  take one and half tablet daily; Therapy: 41ZAC8879 to (Last Rx:30Mar2016)  Requested for: 76JOA2688 Ordered    6   Ferrous Sulfate 325 (65 Fe) MG Oral Tablet; TAKE 1 TABLET 3 TIMES DAILY WITH FOOD; Therapy: 18KKT4546 to ([facility])  Requested for: 80PGH2434; Last   Rx:29Mar2016 Ordered    7  Benadryl 25 MG Oral Tablet; TAKE 1 TABLET EVERY 4 TO 6 HOURS AS NEEDED; Therapy: 95ZIL2807 to (Evaluate:66Ger8740)  Requested for: 63QEP5941; Last   Rx:29Mar2016 Ordered    8  Butalbital-APAP-Caffeine -40 MG Oral Capsule; Therapy: 68JFV1782 to Recorded   9  Magnesium Oxide 400 MG Oral Tablet; TAKE 1 TABLET 3 times daily; Therapy: 05Apr2016 to (Alicia Salgado) Recorded   10  Nortriptyline HCl - 50 MG Oral Capsule; TAKE 1 CAPSULE AT BEDTIME; Therapy: 48WTT5682 to Recorded   11  Topamax 50 MG Oral Tablet (Topiramate); TAKE 1 TABLET DAILY; Therapy: 81VDQ1771 to ([facility])  Requested for: 30AEJ0246; Last    Rx:29Mar2016 Ordered    12  Baclofen 20 MG Oral Tablet; TAKE 1 TAB PO QHS; Therapy: 74Deq8303 to (Evaluate:10Aug2016); Last Rx:16Jxq9371 Ordered   13  DiphenhydrAMINE HCl - 50 MG Oral Capsule; take 1 capsule 1hr before contrast;    Therapy: 22Apr2016 to (Last Rx:22Apr2016) Ordered   14  MethylPREDNISolone 32 MG Oral Tablet (Medrol); 32 mg po 12 hours prior to MRI, 32mg    PO 2 hours Prior to MRI; Therapy: 22Apr2016 to (Last Rx:22Apr2016) Ordered   15  Thiamine HCl - 100 MG Oral Tablet; TAKE 1 TABLET DAILY; Therapy: 05Apr2016 to (Evaluate:53Vbt5132) Recorded   16  TiZANidine HCl - 4 MG Oral Tablet; TAKE 1 TABLET AT BEDTIME; Therapy: 76Xfl5113 to (Evaluate:77Hlr4727); Last Rx:14Apr2016 Ordered   17  Vitamin D3 2000 UNIT Oral Capsule; take 1 capsule by mouth once daily; Therapy: 54BSZ6126 to Recorded    18  Meclizine HCl - 25 MG Oral Tablet; TAKE 1 TABLET 3-4 TIMES DAILY AS NEEDED; Therapy: 51AAN0518 to (Evaluate:44Bux7620)  Requested for: 32VSS0070; Last    Rx:29Mar2016 Ordered    19  [employer]S Vitamin B-12 1000 MCG Oral Tablet; TAKE 1 TABLET DAILY AS DIRECTED;     Therapy: 96SMG3240 to (Evaluate:15Oct2016)  Requested for: 90EVM5815; Last    Rx:29Mar2016 Ordered    Future Appointments    Date/Time Provider Specialty Site   04/27/2016 01:15 PM SANDRA Chatman  Hematology [facility]   05/24/2016 09:00 AM Sharon Alaniz [facility] Neurology [facility]   05/17/2016 01:00 PM Laquita Arce, [address] Urology  [address]     Message   Recorded as Task   Date: 04/08/2016 01:36 PM, Created By: Anthony De La Cruz   Task Name: Care Coordination   Assigned To: Catherine Galvan   Regarding Patient: Letha Myers, Status: Active   CommentNanette Fang - 08 Apr 2016 1:36 PM     TASK CREATED  Task: Care Coordinator to call pt  on 4/19/16 for Day #15-30 KYRA f/u  Hosp  S:-B 4/4 - 4/5/16 for weakness/fall d/t MS exnima  Catherine Galvan - 19 Apr 2016 9:25 AM     TASK EDITED  Care Coordination call to be postponed until after PCP visit w/ Dr Christopher Blake on 4/20   for [facility] is f/u with [facility] & [place] Waiver for home services (recent eviction)  RN for [facility] also f/u w/ pt  for Coumadin/INR and L leg swelling  Catherine Galvan - 25 Apr 2016 1:09 PM     TASK EDITED  Appt  not kept w/ PCP due to transportation issues  Care Coordination call placed today - see note dated 4/25/16  Patient Care Team    Care Team Member Role Specialty Office Number   Roslyn Singleton MD Resident Internal Medicine [phone]   Molly SAHU    Urology [phone]     Signatures   Electronically signed by : Allison Foster, RN; Apr 25 2016  2:10PM EST                       (Author)

## 2018-01-11 NOTE — MISCELLANEOUS
Message   Recorded as Task   Date: 2017 08:41 AM, Created By: Christian Phoenix   Task Name: Call Back   Assigned To: Javon Liu   Regarding Patient: Tk Cox, Status: Active   Comment:    Andre Ochoa - 20 Mar 2017 8:41 AM     TASK CREATED  Caller: Self; Other; [phone] (Home)  wanted to know if Dr Leni Rogers could write her a script for her to get a pick line put in b/c last time it took them 30 minutes to find her vein  She stated that she is going to the [facility] infusion center today at 1:00pm  She  got an infection last time  Call [phone]  Siomara Pryor - 20 Mar 2017 8:54 AM     TASK EDITED  spoke with patient  she has one more infusion left   she is requesting to be seen by Dr Leni Rogers  I spoke with her care manager at [facility], [facility] states her PCP is handling further evaluations  will defer to PCP at this time  Tessie Milligan will inform patient        Active Problems    1  Acid reflux disease (530 81) (K21 9)   2  Acquired polyneuropathy (356 9) (G62 9)   3  Alcohol abuse (305 00) (F10 10)   4  Asthma (493 90) (J45 909)   5  Benign essential hypertension (401 1) (I10)   6  Constipation (564 00) (K59 00)   7  Deep venous thrombosis of distal lower extremity (453 42) (I82 4Z9)   8  Diabetes mellitus, type 2 (250 00) (E11 9)   9  Encounter for monitoring coumadin therapy (V58 83,V58 61) (Z51 81,Z79 01)   10  Encounter for screening mammogram for malignant neoplasm of breast (V76 12)    (Z12 31)   11  Exposure to potentially hazardous body fluids (V15 85) (Z77 21)   12   0 (V49 89) (Z78 9)   13  HIT (heparin-induced thrombocytopenia) (289 84) (D75 82)   14  Iron deficiency anemia (280 9) (D50 9)   15  Itching (698 9) (L29 9)   16  Menorrhagia (626 2) (N92 0)   17  Migraine (346 90) (G43 909)   18  Migraine headache (346 90) (G43 909)   19  Morbid obesity (278 01) (E66 01)   20  Multiple sclerosis (340) (G35)   21  Neck muscle spasm (728 85) (M62 838)   22  Neuropathic pain (729 2) (M79 2)   23  Obstructive sleep apnea (327 23) (G47 33)   24  Other chronic pain (338 29) (G89 29)   25  Peripheral neuropathy (356 9) (G62 9)   26  Post traumatic stress disorder (309 81) (F43 10)   27  Presence of IVC filter (V45 89) (Z95 828)   28  Pulmonary embolism (415 19) (I26 99)   29  Screening for HPV (human papillomavirus) (V73 81) (Z11 51)   30  Screening for STD (sexually transmitted disease) (V74 5) (Z11 3)   31  Syncope (780 2) (R55)   32  Urinary incontinence, unspecified type (788 30) (R32)   33  Vertigo (780 4) (R42)   34  Vitamin B12 deficiency (266 2) (E53 8)   35  Vitamin D deficiency (268 9) (E55 9)    Current Meds   1  Copaxone 40 MG/ML Subcutaneous Solution Prefilled Syringe; inject 40mg sq three   times weekly atleast 48 hours apart; Therapy: 28XRV0042 to (Last Rx:09Mar2017) Ordered   2  DiphenhydrAMINE HCl - 25 MG Oral Tablet; TAKE 1 TABLET 3 times daily PRN itchiness; Therapy: 11FUJ6933 to (Jase Masterson)  Requested for: 46ZHC1559; Last   Rx:27Jan2017 Ordered   3  Docusate Sodium 100 MG Oral Capsule; TAKE 1 CAPSULE TWICE DAILY AS NEEDED; Therapy: 67CAP0606 to (Evaluate:03Jun2017)  Requested for: 49BSR3365; Last   Rx:03Feb2017 Ordered   4  HydroCHLOROthiazide 12 5 MG Oral Tablet; TAKE 1 TABLET DAILY AS NEEDED; Therapy: 10LWU9262 to (Evaluate:89Lhn8736)  Requested for: 64LYH0628; Last   Rx:03Feb2017 Ordered   5  MedroxyPROGESTERone Acetate 150 MG/ML Intramuscular Suspension   (Depo-Provera); inject 1 ml intramuscularly once every 3   months; Therapy: 66RZB2171 to (Evaluate:75Fwn8892)  Requested for: 55ZJZ3521; Last   Rx:16Feb2017 Ordered   6  Methocarbamol 750 MG Oral Tablet; TAKE 1 TABLET TWICE DAILY AS NEEDED; Therapy: 23SLT9693 to (Evaluate:13Feb2017)  Requested for: 70PZY6416; Last   Rx:34Xgc4872 Ordered   7  Multivitamins Oral Capsule; TAKE 1 CAPSULE DAILY;    Therapy: 33DVN8248 to (Evaluate:74Nrc0089)  Requested for: 20RCN0189; Last Rx:03Feb2017 Ordered   8  Nortriptyline HCl - 50 MG Oral Capsule; TAKE 1 CAPSULE AT BEDTIME; Therapy: 53TEJ0854 to (Evaluate:15Rpa0272); Last Rx:03Feb2017 Ordered   9  Polyethylene Glycol 3350 Oral Packet (MiraLax); MIX 1 PACKET IN 8 OUNCES OF   LIQUID AND DRINK ONCE DAILY; Therapy: 01NUZ9332 to (Evaluate:03Jun2017)  Requested for: 83BBH4621; Last   Rx:03Feb2017 Ordered   10  Thiamine HCl - 100 MG Oral Tablet; TAKE 1 TABLET DAILY; Therapy: 20MYH4686 to (Evaluate:02Aug2017)  Requested for: 10UKE7287; Last    Rx:03Feb2017 Ordered   11  Tylenol 8 Hour 650 MG Oral Tablet Extended Release; TAKE 1 TABLET 4 TIMES DAILY    AS NEEDED; Therapy: 81TWA8420 to ([facility]urn[facility] Pedro)  Requested for: 83CMX9253; Last    Rx:03Feb2017 Ordered   12  Ventolin  (90 Base) MCG/ACT Inhalation Aerosol Solution; INHALE 1 TO 2    PUFFS EVERY 4 TO 6 HOURS AS NEEDED; Therapy: 05ZTG6240 to ([facility])  Requested for: 92PFQ0042; Last    Rx:03Feb2017 Ordered   13  Vitamin D3 2000 UNIT Oral Capsule; take 1 capsule by mouth once daily; Therapy: 93HVX1262 to (Last Rx:15Jun2016)  Requested for: 99YQK2899 Ordered   14  Warfarin Sodium 10 MG Oral Tablet; TAKE AS DIRECTED; Therapy: 74ITI0973 to (Last Rx:49Xou3649) Ordered   15  Warfarin Sodium 5 MG Oral Tablet (Coumadin); TAKE 1 TABLET BY MOUTH EVERY    DAY AS DIRECTED; Therapy: 53Jiv4315 to (Evaluate:17Ipx6529)  Requested for: 67Qql0281; Last    Rx:95Noa6461 Ordered   16  Warfarin Sodium 5 MG Oral Tablet; take as directed  take one and half tablet daily; Therapy: 82IVG7577 to (Last Rx:03Feb2017)  Requested for: 63Dfc2844 Ordered    Allergies    1  Ambien CR TBCR   2  Bactrim TABS   3  Heparins   4  Iodinated Contrast Media   5  Iodine External Tincture   6   Sulfa Drugs    Signatures   Electronically signed by : Sylvia Lemus, ; Mar 20 2017  8:55AM EST                       (Author)

## 2018-01-11 NOTE — RESULT NOTES
Verified Results  Hemoglobin A1c- POC 86IYH0701 11:13AM Ema Bass     Test Name Result Flag Reference   HEMOGLOBIN A1C 6 3

## 2018-01-12 NOTE — MISCELLANEOUS
Message   Recorded as Task   Date: 04/06/2016 06:31 AM, Created By: System   Task Name: Hospital [facility]   Assigned To: Lianna Moreno   Regarding Patient: Janae Sweeney, Status: In Progress   Comment:    System - 06 Apr 2016 6:31 AM     Patient discharged from hospital   Patient Name: Janae Sweeney  Patient YOB: 1975  Discharge Date: 04/05/2016  Facility: [facility] 06 Apr 2016 8:08 AM     TASK REASSIGNED: Previously Assigned To Duane Diss - 06 Apr 2016 3:14 PM     TASK EDITED  Called pt  no answer  Message left to call office  [facility]   4/4 - 4/5  Dx: weakness    Pt  recently dx with MS about 2 months ago     Seen by Dr Lucinda Baxter initial visit on 3/29    Pt  to f/u with    Neurology Dr Tania David 4/12 @ 10:30 am    Dr Richardson Hollins on 4/27 @ 1:15 pm   Lianna Moreno - 06 Apr 2016 3:14 PM     TASK IN PROGRESS   Lianna Moreno - 07 Apr 2016 12:49 PM     TASK EDITED  Called and spoke with pt  Lianna Moreno - 07 Apr 2016 1:33 PM     TASK EDITED  No need for pt  to f/u with PCP at this time  Seen on 3/29 by Dr Lucinda Baxter  Pt  is aware of appt's with Dr Tania David and Dr Richardson Hollins  Pt  uses [employer] for her transportation to appt's  Pt  currently c/o weakness and headaches  Pt  advised she can try taking Fioricet for her headaches  Pt  did not get her new Rx's yet magnesium, nortiptyline, omeprazole, but states she will get them tomorrow  I did suggest it may be easier for the pharmacy to deliver the medication to her  Pt  will call [facility] and ask, pt  made aware they may charge a delivery fee  Pt  did stop Cymbalta as instructed and stated it did not help her  Pt  states she has been depressed and would like medication for it  I will send a task to PCP  Pt  states she is moving this weekend and is living with her sister who helps her  Pt  requesting help to have sister trained and paid to help her  I told pt   I will have our social worker reach out to her regarding this  Active Problems    1  Asthma (493 90) (J45 909)   2  Benign essential hypertension (401 1) (I10)   3  Chronic GERD (530 81) (K21 9)   4  Constipation (564 00) (K59 00)   5  Deep venous thrombosis of distal lower extremity (453 42) (I82 4Z9)   6  Diabetes mellitus, type 2 (250 00) (E11 9)   7  Encounter for monitoring coumadin therapy (V58 83,V58 61) (Z51 81,Z79 01)   8  HIT (heparin-induced thrombocytopenia) (289 84) (D75 82)   9  Iron deficiency anemia (280 9) (D50 9)   10  Itching (698 9) (L29 9)   11  Migraine headache (346 90) (G43 909)   12  Morbid obesity (278 01) (E66 01)   13  Multiple sclerosis (340) (G35)   14  Peripheral neuropathy (356 9) (G62 9)   15  Pulmonary embolism (415 19) (I26 99)   16  Temporary low platelet count (987 2) (D69 6)   17  Vertigo (780 4) (R42)   18  Vitamin B12 deficiency (266 2) (E53 8)    Current Meds   1  Benadryl 25 MG Oral Tablet; TAKE 1 TABLET EVERY 4 TO 6 HOURS AS NEEDED; Therapy: 19LLN9039 to (Evaluate:02Rli5295)  Requested for: 20KXQ4538; Last   Rx:29Mar2016 Ordered   2  Butalbital-APAP-Caffeine -40 MG Oral Capsule; Therapy: 59Xfo9986 to Recorded   3  [employer] Vitamin B-12 1000 MCG Oral Tablet; TAKE 1 TABLET DAILY AS DIRECTED; Therapy: 10XOX6969 to (Evaluate:15Oct2016)  Requested for: 29Mar2016; Last   Rx:29Mar2016 Ordered   4  Docusate Sodium 100 MG Oral Capsule; TAKE 1 CAPSULE DAILY; Therapy: 54JFZ5308 to (Evaluate:24Mar2017)  Requested for: 61LPF5964; Last   Rx:29Mar2016 Ordered   5  Ferrous Sulfate 325 (65 Fe) MG Oral Tablet; TAKE 1 TABLET 3 TIMES DAILY WITH   FOOD; Therapy: 25SGE5782 to (Rossy Sales)  Requested for: 94CXF9108; Last   Rx:29Mar2016 Ordered   6  Hydrochlorothiazide 12 5 MG Oral Tablet; TAKE 1 TABLET DAILY AS NEEDED; Therapy: 33TQE8913 to (Wavemark Sales)  Requested for: 03SPR6696; Last   Rx:29Mar2016 Ordered   7   Magnesium Oxide 400 MG Oral Tablet; TAKE 1 TABLET 3 times daily; Therapy: 05Apr2016 to (Joel Bhavyadada) Recorded   8  Meclizine HCl - 25 MG Oral Tablet; TAKE 1 TABLET 3-4 TIMES DAILY AS NEEDED; Therapy: 88DAW0171 to (Evaluate:24Qgq4221)  Requested for: 36ZYV4750; Last   Rx:29Mar2016 Ordered   9  Nortriptyline HCl - 50 MG Oral Capsule; TAKE 1 CAPSULE AT BEDTIME; Therapy: 59WDI3442 to Recorded   10  Omeprazole 40 MG Oral Capsule Delayed Release; TAKE 1 CAPSULE DAILY; Therapy: 52YAG5296 to (Evaluate:27Jun2016)  Requested for: 63QTP3101; Last    Rx:29Mar2016 Ordered   11  Thiamine HCl - 100 MG Oral Tablet; TAKE 1 TABLET DAILY; Therapy: 05Apr2016 to (Evaluate:12Skj2532) Recorded   12  Topamax 50 MG Oral Tablet (Topiramate); TAKE 1 TABLET DAILY; Therapy: 94OWK7748 to (Natalie Flowers)  Requested for: 99WAW5426; Last    Rx:29Mar2016 Ordered   13  Ventolin  (90 Base) MCG/ACT Inhalation Aerosol Solution; INHALE 1 TO 2    PUFFS EVERY 4 TO 6 HOURS AS NEEDED; Therapy: 13FLJ0008 to (Evaluate:33Vxc7066)  Requested for: 29JUM4453; Last    Rx:29Mar2016 Ordered   14  Vitamin D3 2000 UNIT Oral Capsule; take 1 capsule by mouth once daily; Therapy: 83CHL8720 to Recorded   15  Warfarin Sodium 5 MG Oral Tablet; take as directed  take one and half tablet daily; Therapy: 47ZFH6257 to (Last Rx:30Mar2016)  Requested for: 24FZE6037 Ordered    Allergies    1  Iodine External Tincture   2   Sulfa Drugs    Signatures   Electronically signed by : Neela Pierce, ; Apr 7 2016  1:35PM EST                       (Author)

## 2018-01-12 NOTE — MISCELLANEOUS
Reason For Visit  Reason For Visit Free Text Note Form: F/U community resources      Active Problems    1  Asthma (493 90) (J45 909)   2  Benign essential hypertension (401 1) (I10)   3  Chronic GERD (530 81) (K21 9)   4  Constipation (564 00) (K59 00)   5  Deep venous thrombosis of distal lower extremity (453 42) (I82 4Z9)   6  Diabetes mellitus, type 2 (250 00) (E11 9)   7  Encounter for monitoring coumadin therapy (V58 83,V58 61) (Z51 81,Z79 01)   8  HIT (heparin-induced thrombocytopenia) (289 84) (D75 82)   9  Iron deficiency anemia (280 9) (D50 9)   10  Itching (698 9) (L29 9)   11  Migraine headache (346 90) (G43 909)   12  Morbid obesity (278 01) (E66 01)   13  Multiple sclerosis (340) (G35)   14  Peripheral neuropathy (356 9) (G62 9)   15  Pulmonary embolism (415 19) (I26 99)   16  Temporary low platelet count (765 8) (D69 6)   17  Urinary incontinence, unspecified type (788 30) (R32)   18  Vertigo (780 4) (R42)   19  Vitamin B12 deficiency (266 2) (E53 8)    Current Meds   1  Baclofen 20 MG Oral Tablet; TAKE 1 TAB PO QHS; Therapy: 52Lqu8300 to (Evaluate:55Eti9436); Last Rx:12Apr2016 Ordered   2  Benadryl 25 MG Oral Tablet; TAKE 1 TABLET EVERY 4 TO 6 HOURS AS NEEDED; Therapy: 64NJV3270 to (Evaluate:72Uby5944)  Requested for: 39ANZ4389; Last   Rx:29Mar2016 Ordered   3  Butalbital-APAP-Caffeine -40 MG Oral Capsule; Therapy: 48Ugw5485 to Recorded   4  CVS Vitamin B-12 1000 MCG Oral Tablet; TAKE 1 TABLET DAILY AS DIRECTED; Therapy: 58DSR8901 to (Evaluate:21Psz0751)  Requested for: 29Mar2016; Last   Rx:29Mar2016 Ordered   5  Docusate Sodium 100 MG Oral Capsule; TAKE 1 CAPSULE DAILY; Therapy: 94OYT2268 to (Evaluate:24Mar2017)  Requested for: 31TDZ0448; Last   Rx:79Xkx2919 Ordered   6  Ferrous Sulfate 325 (65 Fe) MG Oral Tablet; TAKE 1 TABLET 3 TIMES DAILY WITH FOOD; Therapy: 92SGH2737 to ([facility])  Requested for: 09HQF8635; Last   Rx:29Mar2016 Ordered   7   Hydrochlorothiazide 12 5 MG Oral Tablet; TAKE 1 TABLET DAILY AS NEEDED; Therapy: 78ZBN8316 to (Marck Anaya)  Requested for: 17NHD1031; Last   Rx:29Mar2016 Ordered   8  Magnesium Oxide 400 MG Oral Tablet; TAKE 1 TABLET 3 times daily; Therapy: 05Apr2016 to (Love Nelson) Recorded   9  Meclizine HCl - 25 MG Oral Tablet; TAKE 1 TABLET 3-4 TIMES DAILY AS NEEDED; Therapy: 90PBZ1442 to (Evaluate:09Tvf2964)  Requested for: 03TYW4410; Last   Rx:29Mar2016 Ordered   10  Nortriptyline HCl - 50 MG Oral Capsule; TAKE 1 CAPSULE AT BEDTIME; Therapy: 83OEG6961 to Recorded   11  Omeprazole 40 MG Oral Capsule Delayed Release; TAKE 1 CAPSULE DAILY; Therapy: 78BZG5359 to (Evaluate:27Jun2016)  Requested for: 56DDF2488; Last    Rx:29Mar2016 Ordered   12  Thiamine HCl - 100 MG Oral Tablet; TAKE 1 TABLET DAILY; Therapy: 05Apr2016 to (Evaluate:23Zjb9816) Recorded   13  Topamax 50 MG Oral Tablet (Topiramate); TAKE 1 TABLET DAILY; Therapy: 90FZP0696 to (Marck Taveraschelo)  Requested for: 41HGY6328; Last    Rx:29Mar2016 Ordered   14  Ventolin  (90 Base) MCG/ACT Inhalation Aerosol Solution; INHALE 1 TO 2 PUFFS    EVERY 4 TO 6 HOURS AS NEEDED; Therapy: 04OKY1624 to (Evaluate:02Pwj3866)  Requested for: 09YWT1268; Last    Rx:29Mar2016 Ordered   15  Vitamin D3 2000 UNIT Oral Capsule; take 1 capsule by mouth once daily; Therapy: 05Apr2016 to Recorded   16  Warfarin Sodium 5 MG Oral Tablet; take as directed  take one and half tablet daily; Therapy: 16GRY3652 to (Last Rx:30Mar2016)  Requested for: 65YOZ4837 Ordered    Allergies    1  Iodine External Tincture   2  Sulfa Drugs    Discussion/Summary  Discussion Summary:   SW received RN task to speak wit pt re community resources  Pt recently DC from the hospital with a new DX of MS  Pt relates that she and her sister were suddenly evicted from her sister's apt due to her service dogs  Pt and sister now living in the [employer] on [address]   Pt relates she has started a referral to the [employer]  for the [place] Waiver Program but form sent to her and not the MD's office  Sw attempted to call the [employer] but they would not speak with me, so pt needs to call them directly  Pt to request they send form to us directly  Pt also expressed it is a hardship for her to get out of the hotel room  Pt is using the [facility] but she is very fearful of falling  Pt not comfortable with her medication regimen and is getting regular blood work  If possible pt would like to get home care services  SW to check with MD and assist as indicated        Signatures   Electronically signed by : Asher Carlton LCSW; Apr 14 2016  2:16PM EST                       (Author)

## 2018-01-12 NOTE — MISCELLANEOUS
Message  Message Free Text Note Form: pt was called because of recent INR results are supratherapeutic, pt could not be reached on her phone, clinic will be tasked to contact the patient and instruct her to decrease the amount of coumadin in 2 5 mg daily      Signatures   Electronically signed by : Nazanin Gonzáles DO; Mar 11 2016  3:22PM EST                       (Author)    Electronically signed by : Nazanin Gonzáles DO; Mar 11 2016  3:23PM EST                       (Author)

## 2018-01-12 NOTE — MISCELLANEOUS
Reason For Visit  Reason For Visit Free Text Note Form: Urgent call from pt re housing crisis  Pt relates she and her sister may need to vacate her hotel room possibly today  Pt's sister employer has helped out today but issue remains for tomorrow  Pt has been referred to Raymond Sensor [phone] the [employer] [facility]  He has provided pt with bus pass to get to their office for an intake  Pt has given SW permission via phone to discuss referral with this agency  SW will f/u as indicated  Pt has been informed about the [facility] as a resource IF needed  Pt also informs SW she may lose her [employer] privileges as she needs to be seen by George to be evaluated to be enrolled in their program  Meli Ryder has called the Intake person at [facility] [phone] to request help with same  Also called Ignacio Hernandez with same request    Pt is also trying to seek shelter for her Therapy dogs if she needs to go to the shelter  She will check with an [facility] and with friend who may take them in  Pt is also aware she can present to ED if her physical or mental health situation requires emergency care  SW to continue to follow and assist as indicted  Active Problems    1  Asthma (493 90) (J45 909)   2  Benign essential hypertension (401 1) (I10)   3  Chronic GERD (530 81) (K21 9)   4  Constipation (564 00) (K59 00)   5  Deep venous thrombosis of distal lower extremity (453 42) (I82 4Z9)   6  Diabetes mellitus, type 2 (250 00) (E11 9)   7  Encounter for monitoring coumadin therapy (V58 83,V58 61) (Z51 81,Z79 01)   8  HIT (heparin-induced thrombocytopenia) (289 84) (D75 82)   9  Iron deficiency anemia (280 9) (D50 9)   10  Itching (698 9) (L29 9)   11  Migraine headache (346 90) (G43 909)   12  Morbid obesity (278 01) (E66 01)   13  Multiple sclerosis (340) (G35)   14  Neuropathic pain (729 2) (M79 2)   15  Peripheral neuropathy (356 9) (G62 9)   16  Pulmonary embolism (415 19) (I26 99)   17  Temporary low platelet count (543 9) (D69 6)   18  Urinary incontinence, unspecified type (788 30) (R32)   19  Vertigo (780 4) (R42)   20  Vitamin B12 deficiency (266 2) (E53 8)    Current Meds   1  Baclofen 20 MG Oral Tablet; TAKE 1 TAB PO QHS; Therapy: 12Apr2016 to (Evaluate:10Aug2016); Last Rx:12Apr2016 Ordered   2  Benadryl 25 MG Oral Tablet; TAKE 1 TABLET EVERY 4 TO 6 HOURS AS NEEDED; Therapy: 22BXV7402 to (Evaluate:15Zee1941)  Requested for: 20VXZ1564; Last   Rx:29Mar2016 Ordered   3  Butalbital-APAP-Caffeine -40 MG Oral Capsule; Therapy: 05Apr2016 to Recorded   4  CVS Vitamin B-12 1000 MCG Oral Tablet; TAKE 1 TABLET DAILY AS DIRECTED; Therapy: 74GUR6227 to (Evaluate:15Oct2016)  Requested for: 29Mar2016; Last   Rx:29Mar2016 Ordered   5  Docusate Sodium 100 MG Oral Capsule; TAKE 1 CAPSULE DAILY; Therapy: 77ADF1627 to (Evaluate:24Mar2017)  Requested for: 77DVG7836; Last   Rx:29Mar2016 Ordered   6  Ferrous Sulfate 325 (65 Fe) MG Oral Tablet; TAKE 1 TABLET 3 TIMES DAILY WITH FOOD; Therapy: 96NTB4119 to (Vada Monday)  Requested for: 13CPO5748; Last   Rx:29Mar2016 Ordered   7  Hydrochlorothiazide 12 5 MG Oral Tablet; TAKE 1 TABLET DAILY AS NEEDED; Therapy: 16OKM7178 to (Vada Monday)  Requested for: 44XSV8229; Last   Rx:29Mar2016 Ordered   8  Magnesium Oxide 400 MG Oral Tablet; TAKE 1 TABLET 3 times daily; Therapy: 05Apr2016 to (Vicky Doshi) Recorded   9  Meclizine HCl - 25 MG Oral Tablet; TAKE 1 TABLET 3-4 TIMES DAILY AS NEEDED; Therapy: 55JUZ3229 to (Evaluate:92Xxx5649)  Requested for: 91PMB9621; Last   Rx:29Mar2016 Ordered   10  Nortriptyline HCl - 50 MG Oral Capsule; TAKE 1 CAPSULE AT BEDTIME; Therapy: 68QXV1879 to Recorded   11  Omeprazole 40 MG Oral Capsule Delayed Release; TAKE 1 CAPSULE DAILY; Therapy: 30KIC2674 to (Evaluate:27Jun2016)  Requested for: 56IHG0148; Last    Rx:29Mar2016 Ordered   12   Thiamine HCl - 100 MG Oral Tablet; TAKE 1 TABLET DAILY;

## 2018-01-13 NOTE — RESULT NOTES
Message  I was unable to reach [facility] regarding her abnormal lab results  Jake Bullock needs an outpatient followup with our clinic this week  She is to hold her INR today and get an INR on 3/9/16  She is on coumadin 15mg daily  Her plt on monday was 800s    so she will also need a cbc on 3/9/16  May not be accurate? She needs a hemeonc referral for possible hypercoag state, neuro referral for her MS, and colonoscopy for her microcytic anemia  Her cell number is [phone]  I called her cell 3x and no response  Lu Doan will try and get a hold of her to coordinate care and convey these instructions      Nba Marie  3/8/16 2:06pm       Signatures   Electronically signed by : Gage Callahan DO; Mar  8 2016  2:06PM EST                       (Author)

## 2018-01-13 NOTE — MISCELLANEOUS
Message  Message Free Text Note Form: Pt called stating that pharmacy delivered medication today and coumadin was not present  Records review showed coumadin was rx-ed 7/18/16  nonetheless If rx-ed coumadin again and asked the pt to call pharmacy to clarify  Pt has questions about other medications and will call clinic on Monday  Deanne Dawson    1  Warfarin Sodium 10 MG Oral Tablet; TAKE AS DIRECTED    2  Warfarin Sodium 5 MG Oral Tablet; take as directed   take one and half tablet   daily    Signatures   Electronically signed by : Philip Oneil DO; Jul 22 2016  6:48PM EST                       (Author)    Electronically signed by : Diony Wynne DO; Aug  2 2016  8:12AM EST                       (Review)

## 2018-01-13 NOTE — RESULT NOTES
Verified Results  Coumadin Flow Sheet [id number] 02:26PM Larry Kras     Test Name Result Flag Reference   Diagnosis Pulmonary Embolism     Managing Provider INR Goal Range 2-3     INR 2 9     Recheck INR 2 weeks     Current Dose 15 mg daily     New Dose      Please confirm dose is 15mg daily   Continue at this dose

## 2018-01-14 VITALS
SYSTOLIC BLOOD PRESSURE: 132 MMHG | HEART RATE: 82 BPM | HEIGHT: 67 IN | WEIGHT: 252 LBS | RESPIRATION RATE: 14 BRPM | OXYGEN SATURATION: 98 % | TEMPERATURE: 96.2 F | BODY MASS INDEX: 39.55 KG/M2 | DIASTOLIC BLOOD PRESSURE: 84 MMHG

## 2018-01-14 VITALS
HEART RATE: 100 BPM | TEMPERATURE: 97.2 F | SYSTOLIC BLOOD PRESSURE: 120 MMHG | BODY MASS INDEX: 40.14 KG/M2 | DIASTOLIC BLOOD PRESSURE: 88 MMHG | HEIGHT: 67 IN | WEIGHT: 255.73 LBS

## 2018-01-15 VITALS
SYSTOLIC BLOOD PRESSURE: 128 MMHG | BODY MASS INDEX: 33.49 KG/M2 | HEIGHT: 68 IN | WEIGHT: 221 LBS | DIASTOLIC BLOOD PRESSURE: 86 MMHG

## 2018-01-15 NOTE — MISCELLANEOUS
Reason For Visit  Reason For Visit Free Text Note Form: SW spoke wit pt via phone this date  The [facility] did not pick her up yesterday ad she missed her MD appointment so could not get to the [facility],  Sw did call [facility]  and as per dispatch they did send Elisabeth little  Pt was in the rear and their address is in the front  Pt did relate she would make her way to the front for her  today at about 10:00 am to go to the [facility] this date  [facility] also called [facility] to update them to this situation  They were aware and were expecting pt today  Active Problems    1  Asthma (493 90) (J45 909)   2  Benign essential hypertension (401 1) (I10)   3  Chronic GERD (530 81) (K21 9)   4  Constipation (564 00) (K59 00)   5  Deep venous thrombosis of distal lower extremity (453 42) (I82 4Z9)   6  Diabetes mellitus, type 2 (250 00) (E11 9)   7  Encounter for monitoring coumadin therapy (V58 83,V58 61) (Z51 81,Z79 01)   8  HIT (heparin-induced thrombocytopenia) (289 84) (D75 82)   9  Iron deficiency anemia (280 9) (D50 9)   10  Itching (698 9) (L29 9)   11  Migraine headache (346 90) (G43 909)   12  Morbid obesity (278 01) (E66 01)   13  Multiple sclerosis (340) (G35)   14  Neuropathic pain (729 2) (M79 2)   15  Peripheral neuropathy (356 9) (G62 9)   16  Pulmonary embolism (415 19) (I26 99)   17  Temporary low platelet count (083 0) (D69 6)   18  Urinary incontinence, unspecified type (788 30) (R32)   19  Vertigo (780 4) (R42)   20  Vitamin B12 deficiency (266 2) (E53 8)    Current Meds   1  Baclofen 20 MG Oral Tablet; TAKE 1 TAB PO QHS; Therapy: 98Yar8173 to (Evaluate:85Wcv8070); Last Rx:12Apr2016 Ordered   2  Benadryl 25 MG Oral Tablet; TAKE 1 TABLET EVERY 4 TO 6 HOURS AS NEEDED; Therapy: 29RXN2685 to (Evaluate:50Ssy3901)  Requested for: 15OTY4600; Last   Rx:29Mar2016 Ordered   3  Butalbital-APAP-Caffeine -40 MG Oral Capsule; Therapy: 05Apr2016 to Recorded   4   [employer]S Vitamin B-12 1000 MCG Oral Tablet; TAKE 1 TABLET DAILY AS DIRECTED; Therapy: 09TLS9527 to (Evaluate:15Oct2016)  Requested for: 29Mar2016; Last   Rx:29Mar2016 Ordered   5  DiphenhydrAMINE HCl - 50 MG Oral Capsule; take 1 capsule 1hr before contrast;   Therapy: 22Apr2016 to (Last Rx:22Apr2016) Ordered   6  Docusate Sodium 100 MG Oral Capsule; TAKE 1 CAPSULE DAILY; Therapy: 15ODO6835 to (Evaluate:24Mar2017)  Requested for: 11IGL3948; Last   Rx:29Mar2016 Ordered   7  Ferrous Sulfate 325 (65 Fe) MG Oral Tablet; TAKE 1 TABLET 3 TIMES DAILY WITH FOOD; Therapy: 86GJY0669 to (Grayson Bolaños)  Requested for: 73VFF2533; Last   Rx:29Mar2016 Ordered   8  Hydrochlorothiazide 12 5 MG Oral Tablet; TAKE 1 TABLET DAILY AS NEEDED; Therapy: 56QLR9292 to (Grayson Bolaños)  Requested for: 38IYK1340; Last   Rx:29Mar2016 Ordered   9  Magnesium Oxide 400 MG Oral Tablet; TAKE 1 TABLET 3 times daily; Therapy: 05Apr2016 to (Otto Hedrick) Recorded   10  Meclizine HCl - 25 MG Oral Tablet; TAKE 1 TABLET 3-4 TIMES DAILY AS NEEDED; Therapy: 43NWD0662 to (Evaluate:63Ngf7589)  Requested for: 29ZHG8267; Last    Rx:29Mar2016 Ordered   11  MethylPREDNISolone 32 MG Oral Tablet (Medrol); 32 mg po 12 hours prior to MRI, 32mg    PO 2 hours Prior to MRI; Therapy: 22Apr2016 to (Last Rx:22Apr2016) Ordered   12  Nortriptyline HCl - 50 MG Oral Capsule; TAKE 1 CAPSULE AT BEDTIME; Therapy: 09XUO9847 to Recorded   13  Omeprazole 40 MG Oral Capsule Delayed Release; TAKE 1 CAPSULE DAILY; Therapy: 96AOR9088 to (Evaluate:27Jun2016)  Requested for: 71HKS7691; Last    Rx:29Mar2016 Ordered   14  Thiamine HCl - 100 MG Oral Tablet; TAKE 1 TABLET DAILY; Therapy: 05Apr2016 to (Evaluate:85Edr5484) Recorded   15  TiZANidine HCl - 4 MG Oral Tablet; TAKE 1 TABLET AT BEDTIME; Therapy: 14Apr2016 to (Evaluate:28Ena2527); Last Rx:14Apr2016 Ordered   16  Topamax 50 MG Oral Tablet (Topiramate); TAKE 1 TABLET DAILY;     Therapy: 40ALJ7666 to (Mason Aldana)  Requested for: 92FII1411; Last    Rx:29Mar2016 Ordered   17  Ventolin  (90 Base) MCG/ACT Inhalation Aerosol Solution; INHALE 1 TO 2 PUFFS    EVERY 4 TO 6 HOURS AS NEEDED; Therapy: 00FXY6033 to (Evaluate:45Fpu2894)  Requested for: 30KZL0495; Last    Rx:29Mar2016 Ordered   18  Vitamin D3 2000 UNIT Oral Capsule; take 1 capsule by mouth once daily; Therapy: 80LME6875 to Recorded   19  Warfarin Sodium 5 MG Oral Tablet; take as directed  take one and half tablet daily; Therapy: 54VZU9696 to (Last Rx:30Mar2016)  Requested for: 55ICX2901 Ordered    Allergies    1  Iodine External Tincture   2   Sulfa Drugs    Signatures   Electronically signed by : Leandro Garcia LCSW; Apr 28 2016  1:09PM EST                       (Author)

## 2018-01-15 NOTE — MISCELLANEOUS
Provider Comments  Provider Comments:   1st no show for neurology appointment  No call, no message  Haylee Parikh MA contacted patient to reschedule missed appointment, patient stated she does not wish to come to our practice and will not like to reschedule her appointment        Signatures   Electronically signed by : Erick Ugalde, [facility]; Mar 25 2016 12:15PM EST                       (Author)    Electronically signed by : SANDRA Ferrara ; Mar 27 2016  8:04AM EST                       (Review)

## 2018-01-15 NOTE — RESULT NOTES
Verified Results  CT CHEST AND ABDOMEN WO CONTRAST [id number] 06:05PM Reinaldo Bojorquez     Test Name Result Flag Reference   CT CHEST AND ABDOMEN WO CONTRAST (Report)     CT CHEST AND ABDOMEN WITHOUT IV CONTRAST     INDICATION: Possible migration of inferior vena cava filter     COMPARISON: 5/14/2016     TECHNIQUE: CT examination of the chest and abdomen was performed without IV contrast  This examination, like all CT scans performed in the [facility], was performed utilizing techniques to minimize radiation dose exposure, including    the use of iterative reconstruction and automated exposure control  Axial, sagittal, and coronal reformatted images were submitted for interpretation  Intravenous contrast was not administered as part of this examination  Enteric contrast was not    administered  Rad dose 1006 76 mGy -cm     FINDINGS:     CHEST     LUNGS: Lungs are clear  There is no tracheal or endobronchial lesion  PLEURA: No pleural effusion  VESSELS: Ascending thoracic aorta is 3 6 cm diameter  No aneurysm seen  Pulmonary arteries are grossly normal caliber  HEART: The patient's ventricular blood pool is less dense than the myocardium which is often seen in patients with anemia  Correlate with CBC  MEDIASTINUM AND ALPESH: No significant lymphadenopathy  BODY WALL AND LOWER NECK: Unremarkable  ABDOMEN     LIVER/BILIARY TREE: Unremarkable  GALLBLADDER: There is a minimal amount of sludge in the gallbladder  The gallbladder is mildly distended  There is no pericholecystic inflammation  No visible choledocholithiasis  SPLEEN: Unremarkable  PANCREAS: Unremarkable  ADRENAL GLANDS: Unremarkable  KIDNEYS/URETERS: Unremarkable  No hydronephrosis  VISUALIZED BOWEL: Gastric bypass is noted  No obstruction appreciated  Relatively copious stool throughout the colon  VISUALIZED ABDOMINAL CAVITY: No ascites or free air  No fluid collection       Vasculature: The inferior vena cava filter is present below the level of the renal vein inflow into the inferior vena cava  The filter is just above the confluence of the iliac veins  The positioning is unchanged compared with the previous examination  No obvious    complications are appreciated in the region  OSSEOUS STRUCTURES: There is severe chronic appearing degenerative change of the lower lumbar spine at L5-S1 with grade 2 anterolisthesis L5 on S1 secondary to bilateral L5 pars interarticularis fractures  These appear chronic  There is complete loss    of the disc space  There does not appear to be central canal stenosis  There is mild bilateral foraminal stenosis  IMPRESSION:     Inferior vena cava filter is stable in position compared with the prior study, just above the confluence of the iliac veins in the lower portion of the IVC  Gallbladder is somewhat distended and there is suggestion of minimal gallbladder sludge  Grade 2 anterior spondylolisthesis L5 on S1 due to bilateral spondylolysis L5  Mild bilateral foraminal narrowing at this level  No central canal stenosis seen  Suspected anemia   Correlate with CBC     Gastric bypass noted       ##sigslh##sigslh       Workstation performed: [id number]     Signed by:   Ashley Pearson MD   3/10/17

## 2018-01-16 NOTE — RESULT NOTES
Message   Pls find out from patient who is monitoring her coumadin  I did not order a PT/INR so I am not sure why it is coming up here in my name  We need to find out who it monitoring this so the result can get to them  This is actually abnormal and patient needs med adjustment, but not from me    Thanks     Verified Results  (1) PT WITH INR [id number] 10:14AM Julian Check     Test Name Result Flag Reference   INR 1 08  0 86-1 16   PT 13 8 seconds  11 8-14 1

## 2018-01-16 NOTE — PROGRESS NOTES
I received a phone call from patient Alexander Montesinos on the on-call emergency line regarding the patient receiving information from her  regarding IVC filter placement  The patient stated that she had had a CAT scan  of the abdomen performed back in March 2017 which report and file was sent to her  for a lawsuit against [facility] for placing the IVC filter back in 2009  She stated that the report from her  said that a radiologist associated  with her  from [facility] stated that the patient had a perforated aorta and a perforated IVC  The patient states that currently she denies any symptoms of lightheadedness dizziness chest pain, abdominal pain, shortness of breath, or  weakness other than her chronic weakness from her multiple sclerosis  She was concerned because she had had a CAT scan performed back in March 2017 from her doctor at [facility] Dr Donell Sagastume which did not show any of these issues  I informed  the patient that the CAT scan report that I could see in all scripts from March 8, 2017 showed the IVC filter in place and was unchanged from the comparison noted in May 2016  The report also did not note any obvious complications at that time  I informed  the patient that if she wishes feature to document, she would have to call back during office hours 8 AM to 4:30 PM  I also informed her that if she were to have any issues or complaints or warning signs of bleeding that she should report and call 911  and reportedly emergency room immediately and informed him that she had an IVC filter placed  The patient stated that she was feeling fine at this time would call the office tomorrow to speak with Dr Donell Sagastume  She thanked me for my assistance and stated  that if she had any issues she would call 911 and go to the nearest emergency department for further care and evaluation        Electronically signed by:Shon Nguyen DO  May 11 2017  5:12PM EST Author         Electronically signed by:Terry Parks DO  May 12 2017  5:56AM EST Review

## 2018-01-16 NOTE — MISCELLANEOUS
Reason For Visit  Reason For Visit Free Text Note Form: F/U      Active Problems    1  Asthma (493 90) (J45 909)   2  Benign essential hypertension (401 1) (I10)   3  Chronic GERD (530 81) (K21 9)   4  Constipation (564 00) (K59 00)   5  Deep venous thrombosis of distal lower extremity (453 42) (I82 4Z9)   6  Diabetes mellitus, type 2 (250 00) (E11 9)   7  Encounter for monitoring coumadin therapy (V58 83,V58 61) (Z51 81,Z79 01)   8  HIT (heparin-induced thrombocytopenia) (289 84) (D75 82)   9  Iron deficiency anemia (280 9) (D50 9)   10  Itching (698 9) (L29 9)   11  Migraine headache (346 90) (G43 909)   12  Morbid obesity (278 01) (E66 01)   13  Multiple sclerosis (340) (G35)   14  Neuropathic pain (729 2) (M79 2)   15  Peripheral neuropathy (356 9) (G62 9)   16  Pulmonary embolism (415 19) (I26 99)   17  Temporary low platelet count (276 1) (D69 6)   18  Urinary incontinence, unspecified type (788 30) (R32)   19  Vertigo (780 4) (R42)   20  Vitamin B12 deficiency (266 2) (E53 8)    Current Meds   1  Baclofen 20 MG Oral Tablet; TAKE 1 TAB PO QHS; Therapy: 01Yfv4942 to (Evaluate:10Aug2016); Last Rx:12Apr2016 Ordered   2  Benadryl 25 MG Oral Tablet; TAKE 1 TABLET EVERY 4 TO 6 HOURS AS NEEDED; Therapy: 89KWX2081 to (Evaluate:14Auw6417)  Requested for: 56GOW9590; Last   Rx:29Mar2016 Ordered   3  Butalbital-APAP-Caffeine -40 MG Oral Capsule; Therapy: 71Khs4088 to Recorded   4  [employer] Vitamin B-12 1000 MCG Oral Tablet; TAKE 1 TABLET DAILY AS DIRECTED; Therapy: 73DHX2536 to (Evaluate:15Oct2016)  Requested for: 29Mar2016; Last   Rx:29Mar2016 Ordered   5  DiphenhydrAMINE HCl - 50 MG Oral Capsule; take 1 capsule 1hr before contrast;   Therapy: 73Exn6863 to (Last Rx:22Apr2016) Ordered   6  Docusate Sodium 100 MG Oral Capsule; TAKE 1 CAPSULE DAILY; Therapy: 15CBX7749 to (Evaluate:24Mar2017)  Requested for: 90ABO2002; Last   Rx:29Mar2016 Ordered   7   Ferrous Sulfate 325 (65 Fe) MG Oral Tablet; TAKE 1 TABLET 3 TIMES DAILY WITH FOOD; Therapy: 26UHC2831 to (Donell Batres)  Requested for: 27HDE9053; Last   Rx:29Mar2016 Ordered   8  Hydrochlorothiazide 12 5 MG Oral Tablet; TAKE 1 TABLET DAILY AS NEEDED; Therapy: 59EOC5909 to (Donell Batres)  Requested for: 42NKK1581; Last   Rx:29Mar2016 Ordered   9  Magnesium Oxide 400 MG Oral Tablet; TAKE 1 TABLET 3 times daily; Therapy: 05Apr2016 to (Brenda Argueta) Recorded   10  Meclizine HCl - 25 MG Oral Tablet; TAKE 1 TABLET 3-4 TIMES DAILY AS NEEDED; Therapy: 83KNJ4654 to (Evaluate:26Nga6049)  Requested for: 04FIK0211; Last    Rx:29Mar2016 Ordered   11  MethylPREDNISolone 32 MG Oral Tablet (Medrol); 32 mg po 12 hours prior to MRI, 32mg    PO 2 hours Prior to MRI; Therapy: 22Apr2016 to (Last Rx:22Apr2016) Ordered   12  Nortriptyline HCl - 50 MG Oral Capsule; TAKE 1 CAPSULE AT BEDTIME; Therapy: 18GHD3753 to Recorded   13  Omeprazole 40 MG Oral Capsule Delayed Release; TAKE 1 CAPSULE DAILY; Therapy: 70CBK4595 to (Evaluate:27Jun2016)  Requested for: 95SSX1002; Last    Rx:29Mar2016 Ordered   14  Thiamine HCl - 100 MG Oral Tablet; TAKE 1 TABLET DAILY; Therapy: 05Apr2016 to (Evaluate:70Fav7470) Recorded   15  TiZANidine HCl - 4 MG Oral Tablet; TAKE 1 TABLET AT BEDTIME; Therapy: 44Eem3445 to (Evaluate:68Nkm8744); Last Rx:14Apr2016 Ordered   16  Topamax 50 MG Oral Tablet (Topiramate); TAKE 1 TABLET DAILY; Therapy: 04NHN0653 to (Donell Batres)  Requested for: 45BBO9866; Last    Rx:29Mar2016 Ordered   17  Ventolin  (90 Base) MCG/ACT Inhalation Aerosol Solution; INHALE 1 TO 2 PUFFS    EVERY 4 TO 6 HOURS AS NEEDED; Therapy: 25DNI6006 to (Evaluate:62Bxl0864)  Requested for: 79YEI9631; Last    Rx:29Mar2016 Ordered   18  Vitamin D3 2000 UNIT Oral Capsule; take 1 capsule by mouth once daily; Therapy: 52NYG7893 to Recorded   19  Warfarin Sodium 5 MG Oral Tablet; take as directed  take one and half tablet daily;     Therapy: 01ONB4999 to (Last Rx:70Tug6718)  Requested for: 54RHD3074 Ordered    Allergies    1  Iodine External Tincture   2  Sulfa Drugs    Discussion/Summary  Discussion Summary:   SW spoke with pt again this date  Pt is requesting direct admit to [facility] for her Rehab if possible  At pt's request and with her permission SW has faxed [phone] referral into [facility]  [phone] at [facility]  He indicates they will review records ad let us know if they accept pt  Pt reports she has obtained her [address] appointment on 5/3/16 with the [address] @ 10:00 am ([phone])  Pt is also trying to reschedule her MRI   She does have her her neurology appointment schedules for Wed 4/27/16 at 1:15 pm  Case discussed with [facility]  CM to continue to follow and assist as indicated        Signatures   Electronically signed by : Blanka Walker LCSW; Apr 25 2016  3:57PM EST                       (Author)

## 2018-01-17 NOTE — MISCELLANEOUS
Assessment    1  Iron deficiency anemia (280 9) (D50 9)   2  Amenorrhea (626 0) (N91 2)   3  Multiple sclerosis (340) (G35)    Plan  Amenorrhea    · (1) PREGNANCY TEST (HCG QUALITATIVE); Status:Active; Requested BZI:34SRT6509;    Perform:[facility]; [place]; Ordered; For:Amenorrhea; Ordered By:Eunice Walton;  Iron deficiency anemia    · *1 - [facility] Physician Referral  Consult Only: the expectation is  that the referring provider will communicate back to the patient on treatment options  Evaluation and Treatment: the expectation is that the referred to provider will  communicate back to the patient on treatment options  Status: Hold For - Scheduling   Requested for: 51NGZ1290   Ordered; For: Iron deficiency anemia; Ordered By: Arlen Perez Performed:  Due: 97NPX9527  Care Summary provided  : Yes    Discussion/Summary  Discussion Summary:    your coumadin and get the blood test in 1 week  Script given for the pregnancy test  sched with hematologist  keep appt with neurologist as scheduled  Sched with pain management as per your DR CISNEROS [facility] appt with your PCP here for ongoing care  Patient's Capacity to Self-Care: Patient is able to Self-Care  Counseling Documentation With Imm: The patient was counseled regarding diagnostic results, instructions for management, risk factor reductions, prognosis, impressions  History of Present Illness  HPI: Pt states In Sept was in [place] for a wedding and passed out and her Hemoglobin was 5 so had 3 transfusions and to hold her coumadin and water pill until seen here  was not able to get records and lost the d/c summary  States had her PT / INR done and was to get bust states there was a mixup with her pharmacy so is to  today    would like a pregnancy test as periods are suddenly lighter and feels is late  states was told has to get transfusions for iron as not absorbing and make her constipated so not take  does feel tires but this is not new  still gets muscle spasms at times  review of labs hemoglobin up to 8 so some improvement from 5 back in Sept per patient        Hospital Based Practices Required Assessment:   Pain Assessment   the patient states they have pain  The pain is located in the feet and head  (on a scale of 0 to 10, the patient rates the pain at 9 )    Prefered Language is  [place]  Primary Language is  English  Review of Systems  Complete-Female:   Constitutional: feeling tired  ENT: no complaints of earache, no loss of hearing, no nose bleeds, no nasal discharge, no sore throat, no hoarseness  Cardiovascular: fast heart rate, but no chest pain  Respiratory: shortness of breath  Gastrointestinal: as noted in HPI  Genitourinary: as noted in HPI  Musculoskeletal: unchanged from chronic pain  Psychiatric: depression and feels the MS meds might make depression worse so declined in the past, but as noted in HPI  Active Problems    1  Acid reflux disease (530 81) (K21 9)   2  Acquired polyneuropathy (356 9) (G62 9)   3  Alcohol abuse (305 00) (F10 10)   4  Asthma (493 90) (J45 909)   5  Benign essential hypertension (401 1) (I10)   6  Constipation (564 00) (K59 00)   7  Deep venous thrombosis of distal lower extremity (453 42) (I82 4Z9)   8  Diabetes mellitus, type 2 (250 00) (E11 9)   9  Encounter for monitoring coumadin therapy (V58 83,V58 61) (Z51 81,Z79 01)   10  HIT (heparin-induced thrombocytopenia) (289 84) (D75 82)   11  Iron deficiency anemia (280 9) (D50 9)   12  Itching (698 9) (L29 9)   13  Migraine (346 90) (G43 909)   14  Migraine headache (346 90) (G43 909)   15  Morbid obesity (278 01) (E66 01)   16  Multiple sclerosis (340) (G35)   17  Neuropathic pain (729 2) (M79 2)   18  Obstructive sleep apnea (327 23) (G47 33)   19  Other chronic pain (338 29) (G89 29)   20  Peripheral neuropathy (356 9) (G62 9)   21  Pulmonary embolism (415 19) (I26 99)   22   Urinary incontinence, unspecified type (788 30) (R32)   23  Vertigo (780 4) (R42)   24  Vitamin B12 deficiency (266 2) (E53 8)   25  Vitamin D deficiency (268 9) (E55 9)    Past Medical History    1  History of transient cerebral ischemia (V12 54) (Z86 73)    Surgical History    1  History of Gastric Surgery For Morbid Obesity Gastric Bypass   2  History of Inferior Vena Cava Filter Placement W/ Fluorosc Angiogr Guidance   3  History of Thrombolytics Tissue Plasminogen Activator    Family History  Mother    1  Family history of multiple sclerosis (V17 2) (Z82 0)  Father    2  Family history of cardiac disorder (V17 49) (Z82 49)    Social History    · Engages in binge consumption of alcohol (305 00) (F10 10)   · Former smoker (Z84 84) (M41 973)   · Heavy alcohol use (305 00) (Z72 89)   · Lives with family   · Marijuana   · Uses marijuana (305 20) (F12 90)    Current Meds   1  Baclofen 20 MG Oral Tablet; TAKE 1 TAB PO QHS; Therapy: 38Ucc6455 to (Evaluate:19Nov2016)  Requested for: 57Xwc7607; Last   Rx:07Dyr6067 Ordered   2  Benadryl 25 MG TABS; TAKE 1 TABLET 3 times daily PRN hives/sleep; Therapy: 98LEA0381 to (Evaluate:76Lsl7910)  Requested for: 70Lxh3995; Last   Rx:14Qss3040 Ordered   3  Copaxone 40 MG/ML Subcutaneous Solution Prefilled Syringe; inject 40mg sq three   times weekly atleast 48 hours apart; Therapy: 64INZ1551 to (Evaluate:21Nov2016); Last Rx:68Xbs1743 Ordered   4  [employer]S Vitamin B-12 1000 MCG Oral Tablet; TAKE 1 TABLET DAILY AS DIRECTED; Therapy: 22PHW8590 to (Evaluate:01Jan2017)  Requested for: 86BFJ5874; Last   Rx:15Jun2016 Ordered   5  Docusate Sodium 100 MG Oral Capsule; TAKE 1 CAPSULE TWICE DAILY AS NEEDED; Therapy: 66BGI6759 to (Evaluate:13Oct2016)  Requested for: 26LHE0186; Last   Rx:15Jun2016 Ordered   6  Ergocalciferol 63176 UNIT CAPS; TAKE 1 CAPSULE WEEKLY; Therapy: 15UHI6207 to (Last Rx:11May2016) Ordered   7  Ferrous Sulfate 325 (65 Fe) MG Oral Tablet; TAKE 1 TABLET 3 TIMES DAILY WITH FOOD;    Therapy: 24MBJ6918 to (Evaluate:18Jan2017)  Requested for: 78Iyv0896; Last   Rx:39Nfi1930 Ordered   8  HydroCHLOROthiazide 12 5 MG Oral Tablet; TAKE 1 TABLET DAILY AS NEEDED; Therapy: 68SNK8527 to (Evaluate:18Jan2017)  Requested for: 18Iyg8190; Last   Rx:12Gkv7228 Ordered   9  Magnesium Oxide 400 MG Oral Tablet; TAKE 1 TABLET TWICE DAILY; Therapy: 40EJM8045 to (Evaluate:13Sep2016)  Requested for: 08BPR7786; Last   Rx:15Jun2016 Ordered   10  Meclizine HCl - 25 MG Oral Tablet; TAKE 1 TABLET EVERY 8 TO 12 HOURS AS NEEDED; Therapy: 66XYC8357 to (Evaluate:13Oct2016)  Requested for: 45SZP1662; Last    Rx:15Jun2016 Ordered   11  Multivitamins Oral Capsule; TAKE 1 CAPSULE DAILY; Therapy: 01YVP0689 to (Evaluate:12Dec2016)  Requested for: 14DKV3780; Last    Rx:15Jun2016 Ordered   12  Nortriptyline HCl - 50 MG Oral Capsule; TAKE 1 CAPSULE AT BEDTIME; Therapy: 52KVV2556 to (Evaluate:18Jan2017); Last Rx:00Ggl0476 Ordered   13  Polyethylene Glycol 3350 Oral Packet; MIX 1 PACKET IN 8 OUNCES OF LIQUID AND    DRINK ONCE DAILY; Therapy: 69MGU3013 to (Evaluate:13Oct2016)  Requested for: 24UCU0864; Last    Rx:15Jun2016 Ordered   14  Thiamine HCl - 100 MG Oral Tablet; TAKE 1 TABLET DAILY; Therapy: 26TKH3318 to (Evaluate:32Ppk7637)  Requested for: 88WVR6689; Last    Rx:15Jun2016 Ordered   15  TiZANidine HCl - 4 MG Oral Tablet; TAKE 1 TABLET AT BEDTIME; Therapy: 42Puh1386 to (Evaluate:05Kbt5124); Last Rx:64Skc6223 Ordered   16  Tylenol 8 Hour 650 MG Oral Tablet Extended Release; TAKE 1 TABLET 4 TIMES DAILY AS    NEEDED; Therapy: 70UUX5928 to (Evaluate:61Isr9458)  Requested for: 56Sda2050; Last    Rx:65Nal4630 Ordered   17  Ventolin  (90 Base) MCG/ACT Inhalation Aerosol Solution; INHALE 1 TO 2 PUFFS    EVERY 4 TO 6 HOURS AS NEEDED; Therapy: 55UPM1749 to (Evaluate:11Jan2017)  Requested for: 43UOH9369; Last    Rx:15Jun2016 Ordered   18   Vitamin D3 2000 UNIT Oral Capsule; take 1 capsule by mouth once daily; Therapy: 41BUW7970 to (Last Rx:70Tps7006)  Requested for: 57WTU2246 Ordered   19  Warfarin Sodium 10 MG Oral Tablet; TAKE AS DIRECTED; Therapy: 91HSL1052 to (Last Rx:54Slv1013) Ordered   20  Warfarin Sodium 5 MG Oral Tablet; TAKE 1 TABLET BY MOUTH EVERY DAY AS    DIRECTED; Therapy: 90Hpk7722 to (Evaluate:65Sro5174)  Requested for: 07Bxu9452; Last    Rx:62Jii6438 Ordered   21  Warfarin Sodium 5 MG Oral Tablet; take as directed  take one and half tablet daily; Therapy: 52DRL5579 to (Last Rx:10Jhf2640)  Requested for: 15Jfp8267 Ordered    Allergies    1  Iodine External Tincture   2  Sulfa Drugs    Vitals  Signs   Recorded: 22TUZ5735 01:27PM   Temperature: 98 4 F  Heart Rate: 80  Systolic: 185  Diastolic: 84  Height: 5 ft 7 in  Weight: 251 lb 5 17 oz  BMI Calculated: 39 36  BSA Calculated: 2 23    Physical Exam    Constitutional   General appearance: No acute distress, well appearing and well nourished  Eyes   Conjunctiva and lids: Abnormal   pallor  Ears, Nose, Mouth, and Throat poor dentition, pale but jen buccal mucosa  Pulmonary   Auscultation of lungs: Clear to auscultation  Cardiovascular   Auscultation of heart: Normal rate and rhythm, normal S1 and S2, without murmurs  Examination of extremities for edema and/or varicosities: Abnormal   cap refill >2 seconds  Skin   Skin and subcutaneous tissue: Normal without rashes or lesions  Psychiatric   Orientation to person, place, and time: Normal     Mood and affect: Normal          Attending Note  Collaborating Physician Note: Collaborating Physician: I agree with the Advanced Practitioner note  Future Appointments    Date/Time Provider Specialty Site   02/08/2017 09:30 AM SANDRA Alfaro   Neurology [address] [facility]     Signatures   Electronically signed by : Andrea Jimenez [facility]; [place] 10 2017  2:08PM EST                       (Author)    Electronically signed by : Dar Dowell DO; [place] 10 2017  2:55PM EST (Review)

## 2018-01-17 NOTE — MISCELLANEOUS
Message   Recorded as Task   Date: 2017 04:20 PM, Created By: Aram Mendoza   Task Name: Result Follow Up   Assigned To: DARREN GYN,Team   Regarding Patient: Angie Flores, Status: In Progress   CommentRoger Louis - 2017 4:20 PM     TASK CREATED  Pelvic US with subserosal fibroid measuring 3 3 x 1 9 x 2 1 and submucosal fibroid measuring 2 8 x 3 0 x2 0  Endo lining difficult to eval due to presence of fibroids, however this was estiated at ~4mm  Please notify patient of benign findings and advise that she schedules Depo injection with her next period, as discussed at our office visit  Thanks   Lalo Sherman - 2017 4:33 PM     15[address] Guillermo - 2017 4:37 PM     TASK EDITED  Pt will call for depo apt with next menses  Rx depo provera to her  Discussed fibroids and endo lining        Active Problems    1  Acid reflux disease (530 81) (K21 9)   2  Acquired polyneuropathy (356 9) (G62 9)   3  Alcohol abuse (305 00) (F10 10)   4  Asthma (493 90) (J45 909)   5  Benign essential hypertension (401 1) (I10)   6  Constipation (564 00) (K59 00)   7  Deep venous thrombosis of distal lower extremity (453 42) (I82 4Z9)   8  Diabetes mellitus, type 2 (250 00) (E11 9)   9  Encounter for monitoring coumadin therapy (V58 83,V58 61) (Z51 81,Z79 01)   10  Encounter for screening mammogram for malignant neoplasm of breast (V76 12)    (Z12 31)   11  Exposure to potentially hazardous body fluids (V15 85) (Z77 21)   12   0 (V49 89) (Z78 9)   13  HIT (heparin-induced thrombocytopenia) (289 84) (D75 82)   14  Iron deficiency anemia (280 9) (D50 9)   15  Itching (698 9) (L29 9)   16  Menorrhagia (626 2) (N92 0)   17  Migraine (346 90) (G43 909)   18  Migraine headache (346 90) (G43 909)   19  Morbid obesity (278 01) (E66 01)   20  Multiple sclerosis (340) (G35)   21  Neck muscle spasm (728 85) (M62 838)   22  Neuropathic pain (729 2) (M79 2)   23   Obstructive sleep apnea (327 23) (G47 33)   24  Other chronic pain (338 29) (G89 29)   25  Peripheral neuropathy (356 9) (G62 9)   26  Post traumatic stress disorder (309 81) (F43 10)   27  Pulmonary embolism (415 19) (I26 99)   28  Screening for HPV (human papillomavirus) (V73 81) (Z11 51)   29  Screening for STD (sexually transmitted disease) (V74 5) (Z11 3)   30  Syncope (780 2) (R55)   31  Urinary incontinence, unspecified type (788 30) (R32)   32  Vertigo (780 4) (R42)   33  Vitamin B12 deficiency (266 2) (E53 8)   34  Vitamin D deficiency (268 9) (E55 9)    Current Meds   1  Copaxone 40 MG/ML Subcutaneous Solution Prefilled Syringe; inject 40mg sq three   times weekly atleast 48 hours apart; Therapy: 18CWH7887 to (Evaluate:21Nov2016); Last Rx:25Svi6389 Ordered   2  DiphenhydrAMINE HCl - 25 MG Oral Tablet; TAKE 1 TABLET 3 times daily PRN itchiness; Therapy: 39TMW1867 to (Zoya Plump)  Requested for: 10DCE3476; Last   Rx:27Jan2017 Ordered   3  Docusate Sodium 100 MG Oral Capsule; TAKE 1 CAPSULE TWICE DAILY AS NEEDED; Therapy: 56YWY4193 to (Evaluate:03Jun2017)  Requested for: 12BJH0450; Last   Rx:03Feb2017 Ordered   4  HydroCHLOROthiazide 12 5 MG Oral Tablet; TAKE 1 TABLET DAILY AS NEEDED; Therapy: 30KOS8589 to (Evaluate:53Erq7835)  Requested for: 03NAY4901; Last   Rx:03Feb2017 Ordered   5  MedroxyPROGESTERone Acetate 150 MG/ML Intramuscular Suspension   (Depo-Provera); inject 1 ml intramuscularly once every 3   months; Therapy: 48WAI5326 to (Evaluate:38Rsw9992)  Requested for: 58NHS3164; Last   Rx:16Feb2017 Ordered   6  Methocarbamol 750 MG Oral Tablet; TAKE 1 TABLET TWICE DAILY AS NEEDED; Therapy: 06DNH9879 to (Evaluate:94Pun0036)  Requested for: 79UYE1315; Last   Rx:03Feb2017 Ordered   7  Multivitamins Oral Capsule; TAKE 1 CAPSULE DAILY; Therapy: 59PUI2288 to (Evaluate:36Rob7519)  Requested for: 24QJS6276; Last   Rx:48Hss7182 Ordered   8   Nortriptyline HCl - 50 MG Oral Capsule; TAKE 1 CAPSULE AT BEDTIME; Therapy: 04BFA8342 to (Evaluate:89Dhj4495); Last Rx:03Feb2017 Ordered   9  Polyethylene Glycol 3350 Oral Packet (MiraLax); MIX 1 PACKET IN 8 OUNCES OF   LIQUID AND DRINK ONCE DAILY; Therapy: 04SCR3454 to (Evaluate:03Jun2017)  Requested for: 17PKU8631; Last   Rx:03Feb2017 Ordered   10  Thiamine HCl - 100 MG Oral Tablet; TAKE 1 TABLET DAILY; Therapy: 15UCD4179 to (Evaluate:02Aug2017)  Requested for: 09GDH6798; Last    Rx:03Feb2017 Ordered   11  Tylenol 8 Hour 650 MG Oral Tablet Extended Release; TAKE 1 TABLET 4 TIMES DAILY    AS NEEDED; Therapy: 43WYA6841 to (Ever Frost)  Requested for: 48GRX5593; Last    Rx:03Feb2017 Ordered   12  Ventolin  (90 Base) MCG/ACT Inhalation Aerosol Solution; INHALE 1 TO 2    PUFFS EVERY 4 TO 6 HOURS AS NEEDED; Therapy: 01YMY1055 to (Tiago Brush)  Requested for: 72GCF6055; Last    Rx:03Feb2017 Ordered   13  Vitamin D3 2000 UNIT Oral Capsule; take 1 capsule by mouth once daily; Therapy: 47LDQ3756 to (Last Rx:15Jun2016)  Requested for: 21PLI3082 Ordered   14  Warfarin Sodium 10 MG Oral Tablet; TAKE AS DIRECTED; Therapy: 09QVR5868 to (Last Rx:81Cpt1330) Ordered   15  Warfarin Sodium 5 MG Oral Tablet (Coumadin); TAKE 1 TABLET BY MOUTH EVERY    DAY AS DIRECTED; Therapy: 81Fje8456 to (Evaluate:82Rvc4453)  Requested for: 54Hmd6583; Last    Rx:57Wqk0194 Ordered   16  Warfarin Sodium 5 MG Oral Tablet; take as directed  take one and half tablet daily; Therapy: 91IPY9233 to (Last Rx:64Sfv0365)  Requested for: 65Jlp4110 Ordered    Allergies    1  Ambien CR TBCR   2  Bactrim TABS   3  Heparins   4  Iodinated Contrast Media   5  Iodine External Tincture   6  Sulfa Drugs    Signatures   Electronically signed by :  Natalya Singer, ; Feb 16 2017  4:37PM EST                       (Author)

## 2018-01-17 NOTE — MISCELLANEOUS
Reason For Visit  Reason For Visit Free Text Note Form: SHORT TERM SNF PLACEMENT F/U      Active Problems    1  Asthma (493 90) (J45 909)   2  Benign essential hypertension (401 1) (I10)   3  Chronic GERD (530 81) (K21 9)   4  Constipation (564 00) (K59 00)   5  Deep venous thrombosis of distal lower extremity (453 42) (I82 4Z9)   6  Diabetes mellitus, type 2 (250 00) (E11 9)   7  Encounter for monitoring coumadin therapy (V58 83,V58 61) (Z51 81,Z79 01)   8  HIT (heparin-induced thrombocytopenia) (289 84) (D75 82)   9  Iron deficiency anemia (280 9) (D50 9)   10  Itching (698 9) (L29 9)   11  Migraine headache (346 90) (G43 909)   12  Morbid obesity (278 01) (E66 01)   13  Multiple sclerosis (340) (G35)   14  Neuropathic pain (729 2) (M79 2)   15  Peripheral neuropathy (356 9) (G62 9)   16  Pulmonary embolism (415 19) (I26 99)   17  Temporary low platelet count (733 0) (D69 6)   18  Urinary incontinence, unspecified type (788 30) (R32)   19  Vertigo (780 4) (R42)   20  Vitamin B12 deficiency (266 2) (E53 8)    Current Meds   1  Baclofen 20 MG Oral Tablet; TAKE 1 TAB PO QHS; Therapy: 85Evr2294 to (Evaluate:10Aug2016); Last Rx:12Apr2016 Ordered   2  Benadryl 25 MG Oral Tablet; TAKE 1 TABLET EVERY 4 TO 6 HOURS AS NEEDED; Therapy: 16VQT8633 to (Evaluate:15Kyr6240)  Requested for: 00CED7857; Last   Rx:29Mar2016 Ordered   3  Butalbital-APAP-Caffeine -40 MG Oral Capsule; Therapy: 92Spn5660 to Recorded   4  [employer] Vitamin B-12 1000 MCG Oral Tablet; TAKE 1 TABLET DAILY AS DIRECTED; Therapy: 10PNY3551 to (Evaluate:15Oct2016)  Requested for: 29Mar2016; Last   Rx:29Mar2016 Ordered   5  DiphenhydrAMINE HCl - 50 MG Oral Capsule; take 1 capsule 1hr before contrast;   Therapy: 00Wbx1679 to (Last Rx:22Apr2016) Ordered   6  Docusate Sodium 100 MG Oral Capsule; TAKE 1 CAPSULE DAILY; Therapy: 19GLG4503 to (Evaluate:24Mar2017)  Requested for: 58WXW7234; Last   Rx:29Mar2016 Ordered   7   Ferrous Sulfate 325 (65 Fe) MG Oral Tablet; TAKE 1 TABLET 3 TIMES DAILY WITH FOOD; Therapy: 71GYS2183 to (Candance Cockayne)  Requested for: 32KTP3117; Last   Rx:29Mar2016 Ordered   8  Hydrochlorothiazide 12 5 MG Oral Tablet; TAKE 1 TABLET DAILY AS NEEDED; Therapy: 72KDP6833 to (Candance Cockayne)  Requested for: 04FFF0991; Last   Rx:29Mar2016 Ordered   9  Magnesium Oxide 400 MG Oral Tablet; TAKE 1 TABLET 3 times daily; Therapy: 05Apr2016 to (Daril Lean) Recorded   10  Meclizine HCl - 25 MG Oral Tablet; TAKE 1 TABLET 3-4 TIMES DAILY AS NEEDED; Therapy: 90UDO8117 to (Evaluate:72Ekc9976)  Requested for: 64VMN9942; Last    Rx:29Mar2016 Ordered   11  MethylPREDNISolone 32 MG Oral Tablet (Medrol); 32 mg po 12 hours prior to MRI, 32mg    PO 2 hours Prior to MRI; Therapy: 22Apr2016 to (Last Rx:22Apr2016) Ordered   12  Nortriptyline HCl - 50 MG Oral Capsule; TAKE 1 CAPSULE AT BEDTIME; Therapy: 16IKO0819 to Recorded   13  Omeprazole 40 MG Oral Capsule Delayed Release; TAKE 1 CAPSULE DAILY; Therapy: 50XNK1875 to (Evaluate:27Jun2016)  Requested for: 03OHZ5038; Last    Rx:29Mar2016 Ordered   14  Thiamine HCl - 100 MG Oral Tablet; TAKE 1 TABLET DAILY; Therapy: 05Apr2016 to (Evaluate:38Owz7016) Recorded   15  TiZANidine HCl - 4 MG Oral Tablet; TAKE 1 TABLET AT BEDTIME; Therapy: 70Msw8899 to (Evaluate:99Yfg6979); Last Rx:14Apr2016 Ordered   16  Topamax 50 MG Oral Tablet (Topiramate); TAKE 1 TABLET DAILY; Therapy: 11ROZ3628 to (Candance Cockayne)  Requested for: 41DMJ2719; Last    Rx:29Mar2016 Ordered   17  Ventolin  (90 Base) MCG/ACT Inhalation Aerosol Solution; INHALE 1 TO 2 PUFFS    EVERY 4 TO 6 HOURS AS NEEDED; Therapy: 76RWT9797 to (Evaluate:99Ayh5772)  Requested for: 83TNX0720; Last    Rx:29Mar2016 Ordered   18  Vitamin D3 2000 UNIT Oral Capsule; take 1 capsule by mouth once daily; Therapy: 92HHN5934 to Recorded   19  Warfarin Sodium 5 MG Oral Tablet; take as directed   take one and half tablet daily; Therapy: 65HQP8197 to (Last Rx:30Mar2016)  Requested for: 04SVJ6022 Ordered    Allergies    1  Iodine External Tincture   2  Sulfa Drugs    Discussion/Summary  Discussion Summary:   SW did speak with pt 5/2/16 who is now at [facility] for short term rehab  Pt relates she will go to her  [facility] appointment this date via [facility] and will f/u wit the [address]  She relates she has also will encourage her sister to f/u wit her counseling as well  Sw provided resources for same  Call also made to the SWer at [facility] to see if they can assist with the PA Waiver Program certification as pt has form with her  SW did call a 3rd time to [facility] [phone] on 5/2/16 request form be sent to our [address] [place] [address] location and or to email the form to me  It still as not arrived  SW to f/u with SNF and PA Waiver Program as indicated  Addendum: Call received from Mayela Castellon at [facility]  She relates they will assist pt with the PA waiver Program certification  They will be able to assist pt until she is feeling better and her housing issue is resolved        Signatures   Electronically signed by : Reuben Stevens LCSW; May  3 2016  9:03AM EST                       (Author)    Electronically signed by : Reuben Stevens LCSW; May  3 2016 12:05PM EST                       (Author)

## 2018-01-17 NOTE — MISCELLANEOUS
Message   Recorded as Task   Date: 2017 12:59 PM, Created By: CHRIS BARRY   Task Name: Call Back   Assigned To: Siomara Pryor   Regarding Patient: Marisol Ramesh, Status: In Progress   Comment:    Dorothy Samano - 02 Mar 2017 12:59 PM     TASK CREATED  [place] from [facility] in [place] called returning your call  #: [phone] ask for [facility]   Siomara Pryor - 12 Mar 2017 1:05 PM     TASK EDITED  spoke with [place]  patient needs 2 more venofer appts set up at [address] [facility]  we will call Lilliana at [facility] once this is scheduled   Siomara Pryor - 02 Mar 2017 1:05 PM     TASK IN PROGRESS   Siomara Pryor - 03 Mar 2017 8:14 AM     TASK EDITED  please schedule 2 more venofer appts(300 mg over 2 hours) at [facility] and call Lilliana at [facility] with the times    Thanks! Symone Mcbride - 03 Mar 2017 10:49 AM     TASK EDITED  spoke to [facility] is richard for slb ic 3/13/17 130pm and 3/20/17 1pm        Active Problems    1  Acid reflux disease (530 81) (K21 9)   2  Acquired polyneuropathy (356 9) (G62 9)   3  Alcohol abuse (305 00) (F10 10)   4  Asthma (493 90) (J45 909)   5  Benign essential hypertension (401 1) (I10)   6  Constipation (564 00) (K59 00)   7  Deep venous thrombosis of distal lower extremity (453 42) (I82 4Z9)   8  Diabetes mellitus, type 2 (250 00) (E11 9)   9  Encounter for monitoring coumadin therapy (V58 83,V58 61) (Z51 81,Z79 01)   10  Encounter for screening mammogram for malignant neoplasm of breast (V76 12)    (Z12 31)   11  Exposure to potentially hazardous body fluids (V15 85) (Z77 21)   12   0 (V49 89) (Z78 9)   13  HIT (heparin-induced thrombocytopenia) (289 84) (D75 82)   14  Iron deficiency anemia (280 9) (D50 9)   15  Itching (698 9) (L29 9)   16  Menorrhagia (626 2) (N92 0)   17  Migraine (346 90) (G43 909)   18  Migraine headache (346 90) (G43 909)   19  Morbid obesity (278 01) (E66 01)   20  Multiple sclerosis (340) (G35)   21   Neck muscle spasm (728 85) (M62 838)   22  Neuropathic pain (729 2) (M79 2)   23  Obstructive sleep apnea (327 23) (G47 33)   24  Other chronic pain (338 29) (G89 29)   25  Peripheral neuropathy (356 9) (G62 9)   26  Post traumatic stress disorder (309 81) (F43 10)   27  Presence of IVC filter (V45 89) (Z95 828)   28  Pulmonary embolism (415 19) (I26 99)   29  Screening for HPV (human papillomavirus) (V73 81) (Z11 51)   30  Screening for STD (sexually transmitted disease) (V74 5) (Z11 3)   31  Syncope (780 2) (R55)   32  Urinary incontinence, unspecified type (788 30) (R32)   33  Vertigo (780 4) (R42)   34  Vitamin B12 deficiency (266 2) (E53 8)   35  Vitamin D deficiency (268 9) (E55 9)    Current Meds   1  Copaxone 40 MG/ML Subcutaneous Solution Prefilled Syringe; inject 40mg sq three   times weekly atleast 48 hours apart; Therapy: 30EBX9572 to (Evaluate:21Nov2016); Last Rx:25May2016 Ordered   2  DiphenhydrAMINE HCl - 25 MG Oral Tablet; TAKE 1 TABLET 3 times daily PRN itchiness; Therapy: 36MTR9873 to (Marlise Comfort)  Requested for: 10ZYV8822; Last   Rx:27Jan2017 Ordered   3  Docusate Sodium 100 MG Oral Capsule; TAKE 1 CAPSULE TWICE DAILY AS NEEDED; Therapy: 58VXY7530 to (Evaluate:03Jun2017)  Requested for: 77UWO4080; Last   Rx:03Feb2017 Ordered   4  HydroCHLOROthiazide 12 5 MG Oral Tablet; TAKE 1 TABLET DAILY AS NEEDED; Therapy: 47LHE2893 to (Evaluate:84Jxj1570)  Requested for: 80YOQ3428; Last   Rx:03Feb2017 Ordered   5  MedroxyPROGESTERone Acetate 150 MG/ML Intramuscular Suspension   (Depo-Provera); inject 1 ml intramuscularly once every 3   months; Therapy: 23MRX5223 to (Evaluate:71Wbm7604)  Requested for: 42RBV6521; Last   Rx:98Uqe6198 Ordered   6  Methocarbamol 750 MG Oral Tablet; TAKE 1 TABLET TWICE DAILY AS NEEDED; Therapy: 19RNY7725 to (Evaluate:13Feb2017)  Requested for: 12FYL4542; Last   Rx:03Feb2017 Ordered   7  Multivitamins Oral Capsule; TAKE 1 CAPSULE DAILY;    Therapy: 54NBI3749 to (Evaluate:37Uuq1415) Requested for: 95WJM0603; Last   Rx:62Scu5811 Ordered   8  Nortriptyline HCl - 50 MG Oral Capsule; TAKE 1 CAPSULE AT BEDTIME; Therapy: 71WON6192 to (Evaluate:63Dod3668); Last Rx:03Feb2017 Ordered   9  Polyethylene Glycol 3350 Oral Packet (MiraLax); MIX 1 PACKET IN 8 OUNCES OF   LIQUID AND DRINK ONCE DAILY; Therapy: 07FDQ8484 to (Evaluate:03Jun2017)  Requested for: 35GUS5924; Last   Rx:03Feb2017 Ordered   10  Thiamine HCl - 100 MG Oral Tablet; TAKE 1 TABLET DAILY; Therapy: 22GHC4551 to (Evaluate:98Fwk5761)  Requested for: 80BPI2628; Last    Rx:03Feb2017 Ordered   11  Tylenol 8 Hour 650 MG Oral Tablet Extended Release; TAKE 1 TABLET 4 TIMES DAILY    AS NEEDED; Therapy: 13TIG9652 to (Taylor Gipson)  Requested for: 75KAQ5477; Last    Rx:03Feb2017 Ordered   12  Ventolin  (90 Base) MCG/ACT Inhalation Aerosol Solution; INHALE 1 TO 2    PUFFS EVERY 4 TO 6 HOURS AS NEEDED; Therapy: 65EQQ4722 to (Shereen Vizcarra)  Requested for: 92TPG7654; Last    Rx:03Feb2017 Ordered   13  Vitamin D3 2000 UNIT Oral Capsule; take 1 capsule by mouth once daily; Therapy: 35DVJ4126 to (Last Rx:15Jun2016)  Requested for: 53MKZ5675 Ordered   14  Warfarin Sodium 10 MG Oral Tablet; TAKE AS DIRECTED; Therapy: 76LAC5401 to (Last Rx:98Juf4833) Ordered   15  Warfarin Sodium 5 MG Oral Tablet (Coumadin); TAKE 1 TABLET BY MOUTH EVERY    DAY AS DIRECTED; Therapy: 81Aca9912 to (Evaluate:04Yop4081)  Requested for: 30Fcn6464; Last    Rx:99Hsn9705 Ordered   16  Warfarin Sodium 5 MG Oral Tablet; take as directed  take one and half tablet daily; Therapy: 83TVU4445 to (Last Rx:59Uds6508)  Requested for: 14Cso1549 Ordered    Allergies    1  Ambien CR TBCR   2  Bactrim TABS   3  Heparins   4  Iodinated Contrast Media   5  Iodine External Tincture   6   Sulfa Drugs    Signatures   Electronically signed by : Lisa Huertas ; Mar  3 2017 10:54AM EST                       (Author)

## 2018-01-22 VITALS
SYSTOLIC BLOOD PRESSURE: 128 MMHG | HEART RATE: 80 BPM | WEIGHT: 251.32 LBS | HEIGHT: 67 IN | TEMPERATURE: 98.4 F | DIASTOLIC BLOOD PRESSURE: 84 MMHG | BODY MASS INDEX: 39.45 KG/M2

## 2018-05-25 ENCOUNTER — HOSPITAL ENCOUNTER (EMERGENCY)
Age: 43
Discharge: HOME | End: 2018-05-25
Attending: EMERGENCY MEDICINE | Admitting: EMERGENCY MEDICINE
Payer: MEDICAID

## 2018-05-25 ENCOUNTER — APPOINTMENT (OUTPATIENT)
Dept: GENERAL RADIOLOGY | Age: 43
End: 2018-05-25
Attending: EMERGENCY MEDICINE
Payer: MEDICAID

## 2018-05-25 VITALS
RESPIRATION RATE: 16 BRPM | SYSTOLIC BLOOD PRESSURE: 113 MMHG | OXYGEN SATURATION: 100 % | BODY MASS INDEX: 39.4 KG/M2 | HEIGHT: 68 IN | HEART RATE: 66 BPM | TEMPERATURE: 98.3 F | WEIGHT: 260 LBS | DIASTOLIC BLOOD PRESSURE: 95 MMHG

## 2018-05-25 DIAGNOSIS — G43.809 OTHER MIGRAINE WITHOUT STATUS MIGRAINOSUS, NOT INTRACTABLE: Primary | ICD-10-CM

## 2018-05-25 DIAGNOSIS — R06.02 SOB (SHORTNESS OF BREATH): ICD-10-CM

## 2018-05-25 LAB
ANION GAP SERPL CALC-SCNC: 11 MMOL/L (ref 3–18)
BASOPHILS # BLD: 0 K/UL (ref 0–0.06)
BASOPHILS NFR BLD: 1 % (ref 0–2)
BUN SERPL-MCNC: 6 MG/DL (ref 7–18)
BUN/CREAT SERPL: 10 (ref 12–20)
CALCIUM SERPL-MCNC: 8.4 MG/DL (ref 8.5–10.1)
CHLORIDE SERPL-SCNC: 103 MMOL/L (ref 100–108)
CK MB CFR SERPL CALC: 1.7 % (ref 0–4)
CK MB SERPL-MCNC: 1 NG/ML (ref 5–25)
CK SERPL-CCNC: 60 U/L (ref 26–192)
CO2 SERPL-SCNC: 24 MMOL/L (ref 21–32)
CREAT SERPL-MCNC: 0.61 MG/DL (ref 0.6–1.3)
DIFFERENTIAL METHOD BLD: ABNORMAL
EOSINOPHIL # BLD: 0 K/UL (ref 0–0.4)
EOSINOPHIL NFR BLD: 0 % (ref 0–5)
ERYTHROCYTE [DISTWIDTH] IN BLOOD BY AUTOMATED COUNT: 16.8 % (ref 11.6–14.5)
GLUCOSE SERPL-MCNC: 62 MG/DL (ref 74–99)
HCT VFR BLD AUTO: 30.4 % (ref 35–45)
HGB BLD-MCNC: 9.2 G/DL (ref 12–16)
LYMPHOCYTES # BLD: 0.7 K/UL (ref 0.9–3.6)
LYMPHOCYTES NFR BLD: 13 % (ref 21–52)
MCH RBC QN AUTO: 22.4 PG (ref 24–34)
MCHC RBC AUTO-ENTMCNC: 30.3 G/DL (ref 31–37)
MCV RBC AUTO: 74 FL (ref 74–97)
MONOCYTES # BLD: 0.4 K/UL (ref 0.05–1.2)
MONOCYTES NFR BLD: 8 % (ref 3–10)
NEUTS SEG # BLD: 4.2 K/UL (ref 1.8–8)
NEUTS SEG NFR BLD: 78 % (ref 40–73)
PLATELET # BLD AUTO: 298 K/UL (ref 135–420)
PMV BLD AUTO: 9.9 FL (ref 9.2–11.8)
POTASSIUM SERPL-SCNC: 3.8 MMOL/L (ref 3.5–5.5)
RBC # BLD AUTO: 4.11 M/UL (ref 4.2–5.3)
SODIUM SERPL-SCNC: 138 MMOL/L (ref 136–145)
TROPONIN I SERPL-MCNC: <0.02 NG/ML (ref 0–0.04)
WBC # BLD AUTO: 5.3 K/UL (ref 4.6–13.2)

## 2018-05-25 PROCEDURE — 74011250637 HC RX REV CODE- 250/637: Performed by: PHYSICIAN ASSISTANT

## 2018-05-25 PROCEDURE — 82553 CREATINE MB FRACTION: CPT

## 2018-05-25 PROCEDURE — 74011250636 HC RX REV CODE- 250/636: Performed by: PHYSICIAN ASSISTANT

## 2018-05-25 PROCEDURE — 85025 COMPLETE CBC W/AUTO DIFF WBC: CPT

## 2018-05-25 PROCEDURE — 71045 X-RAY EXAM CHEST 1 VIEW: CPT

## 2018-05-25 PROCEDURE — 96375 TX/PRO/DX INJ NEW DRUG ADDON: CPT

## 2018-05-25 PROCEDURE — 93005 ELECTROCARDIOGRAM TRACING: CPT

## 2018-05-25 PROCEDURE — 99285 EMERGENCY DEPT VISIT HI MDM: CPT

## 2018-05-25 PROCEDURE — 96361 HYDRATE IV INFUSION ADD-ON: CPT

## 2018-05-25 PROCEDURE — 80048 BASIC METABOLIC PNL TOTAL CA: CPT

## 2018-05-25 PROCEDURE — 96374 THER/PROPH/DIAG INJ IV PUSH: CPT

## 2018-05-25 RX ORDER — ACETAMINOPHEN 500 MG
1000 TABLET ORAL
Status: COMPLETED | OUTPATIENT
Start: 2018-05-25 | End: 2018-05-25

## 2018-05-25 RX ORDER — DIPHENHYDRAMINE HYDROCHLORIDE 50 MG/ML
25 INJECTION, SOLUTION INTRAMUSCULAR; INTRAVENOUS
Status: COMPLETED | OUTPATIENT
Start: 2018-05-25 | End: 2018-05-25

## 2018-05-25 RX ORDER — BACLOFEN 20 MG/1
20 TABLET ORAL 3 TIMES DAILY
COMMUNITY

## 2018-05-25 RX ORDER — METOCLOPRAMIDE HYDROCHLORIDE 5 MG/ML
5 INJECTION INTRAMUSCULAR; INTRAVENOUS ONCE
Status: COMPLETED | OUTPATIENT
Start: 2018-05-25 | End: 2018-05-25

## 2018-05-25 RX ORDER — KETOROLAC TROMETHAMINE 30 MG/ML
30 INJECTION, SOLUTION INTRAMUSCULAR; INTRAVENOUS
Status: COMPLETED | OUTPATIENT
Start: 2018-05-25 | End: 2018-05-25

## 2018-05-25 RX ADMIN — KETOROLAC TROMETHAMINE 30 MG: 30 INJECTION, SOLUTION INTRAMUSCULAR at 17:06

## 2018-05-25 RX ADMIN — METOCLOPRAMIDE 5 MG: 5 INJECTION, SOLUTION INTRAMUSCULAR; INTRAVENOUS at 17:04

## 2018-05-25 RX ADMIN — ACETAMINOPHEN 1000 MG: 500 TABLET, FILM COATED ORAL at 18:45

## 2018-05-25 RX ADMIN — DIPHENHYDRAMINE HYDROCHLORIDE 25 MG: 50 INJECTION, SOLUTION INTRAMUSCULAR; INTRAVENOUS at 17:05

## 2018-05-25 RX ADMIN — SODIUM CHLORIDE 1000 ML: 900 INJECTION, SOLUTION INTRAVENOUS at 17:01

## 2018-05-25 NOTE — ED TRIAGE NOTES
P)t c/o shortness of breath started this am, pt also c/o nausea and vomiting x 6 . Pt has stated hx of vertigo, pt had IVC filtered removed pt has stated hx of blood clots.

## 2018-05-25 NOTE — ED PROVIDER NOTES
EMERGENCY DEPARTMENT HISTORY AND PHYSICAL EXAM    Date: 5/25/2018  Patient Name: Elke Huerta    History of Presenting Illness     Chief Complaint   Patient presents with    Shortness of Breath         History Provided By: Patient    Chief Complaint: Migraine headache  Duration: 2 Days  Timing:  Gradual  Location: Head, chest  Quality: Stabbing, Sharp and Pressure  Severity: 6 out of 10  Modifying Factors: Light, sound makes pain worse  Associated Symptoms: shortness of breath, chest pressure, nausea, photophobia      HPI: Elke Huerta is a 37 y.o. female with a PMH of Anemia, Migraines, GERD who presents to the ER via EMS c/o headache, shortness of breath, nausea and light sensitivity. Patient states her symptoms began yesterday, but have continued to worsen. Patient reports a history of needing frequent transfusions, however has not had one in a while. She is concerned her Hemoglobin may be low. She has tried taking her prescribed Migraine medications with no relief in her symptoms. She denied any recent falls or head trauma. She rates her pain 8/10. She denied any recent fevers, chills, neck pain or stiffness, chest pain, abd pain, and has no other complaints. PCP: Myke Martinez NP    Current Outpatient Prescriptions   Medication Sig Dispense Refill    baclofen (LIORESAL) 20 mg tablet Take 20 mg by mouth three (3) times daily. Past History     Past Medical History:  Past Medical History:   Diagnosis Date    Acid reflux     Migraine        Past Surgical History:  No past surgical history on file. Family History:  No family history on file. Social History:  Social History   Substance Use Topics    Smoking status: Light Tobacco Smoker    Smokeless tobacco: Never Used    Alcohol use Not on file       Allergies:   Allergies   Allergen Reactions    Sulfa (Sulfonamide Antibiotics) Anaphylaxis    Ambien [Zolpidem] Other (comments)     Sleep walking    Iodine Hives         Review of Systems   Review of Systems   Constitutional: Negative for chills, fatigue and fever. HENT: Negative. Negative for sore throat. Eyes: Positive for photophobia. Respiratory: Positive for shortness of breath. Negative for cough. Cardiovascular: Positive for chest pain. Negative for palpitations. Gastrointestinal: Positive for nausea. Negative for abdominal pain and vomiting. Genitourinary: Negative for dysuria. Musculoskeletal: Negative. Skin: Negative. Neurological: Positive for headaches. Negative for dizziness, weakness and light-headedness. Psychiatric/Behavioral: Negative. All other systems reviewed and are negative. Physical Exam     Vitals:    05/25/18 1615 05/25/18 1630 05/25/18 1645 05/25/18 1700   BP:   (!) 148/92 149/79   Pulse:       Resp:       Temp:       SpO2: 100% 100%     Weight:       Height:         Physical Exam   Constitutional: She is oriented to person, place, and time. She appears well-developed and well-nourished. She appears distressed. HENT:   Head: Normocephalic and atraumatic. Mouth/Throat: Oropharynx is clear and moist.   Eyes: Conjunctivae and EOM are normal. Pupils are equal, round, and reactive to light. No scleral icterus. Neck: Neck supple. No JVD present. No tracheal deviation present. Cardiovascular: Normal rate, regular rhythm and normal heart sounds. Pulmonary/Chest: Effort normal and breath sounds normal. No respiratory distress. She has no wheezes. She has no rales. She exhibits no tenderness. Abdominal: Soft. There is no tenderness. Musculoskeletal: Normal range of motion. Neurological: She is alert and oriented to person, place, and time. She has normal strength. No cranial nerve deficit. Gait normal. GCS eye subscore is 4. GCS verbal subscore is 5. GCS motor subscore is 6.   CN 2-12 grossly in tact     Skin: Skin is warm and dry. She is not diaphoretic. Psychiatric: She has a normal mood and affect.    Nursing note and vitals reviewed. Diagnostic Study Results     Labs -     Recent Results (from the past 12 hour(s))   CBC WITH AUTOMATED DIFF    Collection Time: 05/25/18  4:12 PM   Result Value Ref Range    WBC 5.3 4.6 - 13.2 K/uL    RBC 4.11 (L) 4.20 - 5.30 M/uL    HGB 9.2 (L) 12.0 - 16.0 g/dL    HCT 30.4 (L) 35.0 - 45.0 %    MCV 74.0 74.0 - 97.0 FL    MCH 22.4 (L) 24.0 - 34.0 PG    MCHC 30.3 (L) 31.0 - 37.0 g/dL    RDW 16.8 (H) 11.6 - 14.5 %    PLATELET 786 797 - 676 K/uL    MPV 9.9 9.2 - 11.8 FL    NEUTROPHILS 78 (H) 40 - 73 %    LYMPHOCYTES 13 (L) 21 - 52 %    MONOCYTES 8 3 - 10 %    EOSINOPHILS 0 0 - 5 %    BASOPHILS 1 0 - 2 %    ABS. NEUTROPHILS 4.2 1.8 - 8.0 K/UL    ABS. LYMPHOCYTES 0.7 (L) 0.9 - 3.6 K/UL    ABS. MONOCYTES 0.4 0.05 - 1.2 K/UL    ABS. EOSINOPHILS 0.0 0.0 - 0.4 K/UL    ABS.  BASOPHILS 0.0 0.0 - 0.06 K/UL    DF AUTOMATED     METABOLIC PANEL, BASIC    Collection Time: 05/25/18  4:12 PM   Result Value Ref Range    Sodium 138 136 - 145 mmol/L    Potassium 3.8 3.5 - 5.5 mmol/L    Chloride 103 100 - 108 mmol/L    CO2 24 21 - 32 mmol/L    Anion gap 11 3.0 - 18 mmol/L    Glucose 62 (L) 74 - 99 mg/dL    BUN 6 (L) 7.0 - 18 MG/DL    Creatinine 0.61 0.6 - 1.3 MG/DL    BUN/Creatinine ratio 10 (L) 12 - 20      GFR est AA >60 >60 ml/min/1.73m2    GFR est non-AA >60 >60 ml/min/1.73m2    Calcium 8.4 (L) 8.5 - 10.1 MG/DL   CARDIAC PANEL,(CK, CKMB & TROPONIN)    Collection Time: 05/25/18  4:12 PM   Result Value Ref Range    CK 60 26 - 192 U/L    CK - MB 1.0 <3.6 ng/ml    CK-MB Index 1.7 0.0 - 4.0 %    Troponin-I, Qt. <0.02 0.0 - 0.045 NG/ML   EKG, 12 LEAD, INITIAL    Collection Time: 05/25/18  5:25 PM   Result Value Ref Range    Ventricular Rate 70 BPM    Atrial Rate 70 BPM    P-R Interval 166 ms    QRS Duration 90 ms    Q-T Interval 438 ms    QTC Calculation (Bezet) 473 ms    Calculated P Axis 11 degrees    Calculated R Axis 47 degrees    Calculated T Axis 21 degrees    Diagnosis       Normal sinus rhythm  Normal ECG  No previous ECGs available         Radiologic Studies -   XR CHEST PORT    (Results Pending)     CT Results  (Last 48 hours)    None        CXR Results  (Last 48 hours)    None            Medical Decision Making   I am the first provider for this patient. I reviewed the vital signs, available nursing notes, past medical history, past surgical history, family history and social history. Vital Signs-Reviewed the patient's vital signs. Records Reviewed: Nursing Notes and Old Medical Records    ED Course:   5:08 PM  36 y/o female c/o migraine headache, SOB, chest pressure onset yesterday. Hx of anemia; pt states not having transfusion in a while and is concerned her hemoglobin has dropped. EKG with no acute changes. Will plan on labs, meds for headache and will reeval.  Stacey Leahy PA-C     6:25 PM  Pt reports pain has improved and she is feeling better at this time. Discussed all results with pt. Will have f/u with PCP next week and discussed return precautions if symptoms worsen/return. All questions answered and patient in agreement with plan of care. Will plan for discharge. Stacey Leahy PA-C        Disposition:  Discharged    DISCHARGE NOTE:       Care plan outlined and precautions discussed. Patient has no new complaints, changes, or physical findings. Results of labs, cxr, ekg were reviewed with the patient. All medications were reviewed with the patient; will d/c home with n/a. All of pt's questions and concerns were addressed. Patient was instructed and agrees to follow up with PCP, as well as to return to the ED upon further deterioration. Patient is ready to go home.     Follow-up Information     Follow up With Details Comments Contact Info    [facility] EMERGENCY DEPT  If symptoms worsen [address]    Dev Hall NP Call in 3 days As needed for ER follow up  [address]  [address] [facility] [address] [phone]            Current Discharge Medication List      CONTINUE these medications which have NOT CHANGED    Details   baclofen (LIORESAL) 20 mg tablet Take 20 mg by mouth three (3) times daily. Provider Notes (Medical Decision Making):     Procedures:  Procedures        Diagnosis     Clinical Impression:   1. Other migraine without status migrainosus, not intractable    2.  SOB (shortness of breath)

## 2018-05-25 NOTE — DISCHARGE INSTRUCTIONS
Migraine Headache: Care Instructions  Your Care Instructions  Migraines are painful, throbbing headaches that often start on one side of the head. They may cause nausea and vomiting and make you sensitive to light, sound, or smell. Without treatment, migraines can last from 4 hours to a few days. Medicines can help prevent migraines or stop them after they have started. Your doctor can help you find which ones work best for you. Follow-up care is a key part of your treatment and safety. Be sure to make and go to all appointments, and call your doctor if you are having problems. It's also a good idea to know your test results and keep a list of the medicines you take. How can you care for yourself at home? · Do not drive if you have taken a prescription pain medicine. · Rest in a quiet, dark room until your headache is gone. Close your eyes, and try to relax or go to sleep. Don't watch TV or read. · Put a cold, moist cloth or cold pack on the painful area for 10 to 20 minutes at a time. Put a thin cloth between the cold pack and your skin. · Use a warm, moist towel or a heating pad set on low to relax tight shoulder and neck muscles. · Have someone gently massage your neck and shoulders. · Take your medicines exactly as prescribed. Call your doctor if you think you are having a problem with your medicine. You will get more details on the specific medicines your doctor prescribes. · Be careful not to take pain medicine more often than the instructions allow. You could get worse or more frequent headaches when the medicine wears off. To prevent migraines  · Keep a headache diary so you can figure out what triggers your headaches. Avoiding triggers may help you prevent headaches. Record when each headache began, how long it lasted, and what the pain was like.  (Was it throbbing, aching, stabbing, or dull?) Write down any other symptoms you had with the headache, such as nausea, flashing lights or dark spots, or sensitivity to bright light or loud noise. Note if the headache occurred near your period. List anything that might have triggered the headache. Triggers may include certain foods (chocolate, cheese, wine) or odors, smoke, bright light, stress, or lack of sleep. · If your doctor has prescribed medicine for your migraines, take it as directed. You may have medicine that you take only when you get a migraine and medicine that you take all the time to help prevent migraines. ¨ If your doctor has prescribed medicine for when you get a headache, take it at the first sign of a migraine, unless your doctor has given you other instructions. ¨ If your doctor has prescribed medicine to prevent migraines, take it exactly as prescribed. Call your doctor if you think you are having a problem with your medicine. · Find healthy ways to deal with stress. Migraines are most common during or right after stressful times. Take time to relax before and after you do something that has caused a migraine in the past.  · Try to keep your muscles relaxed by keeping good posture. Check your jaw, face, neck, and shoulder muscles for tension. Try to relax them. When you sit at a desk, change positions often. And make sure to stretch for 30 seconds each hour. · Get plenty of sleep and exercise. · Eat meals on a regular schedule. Avoid foods and drinks that often trigger migraines. These include chocolate, alcohol (especially red wine and port), aspartame, monosodium glutamate (MSG), and some additives found in foods (such as hot dogs, day, cold cuts, aged cheeses, and pickled foods). · Limit caffeine. Don't drink too much coffee, tea, or soda. But don't quit caffeine suddenly. That can also give you migraines. · Do not smoke or allow others to smoke around you. If you need help quitting, talk to your doctor about stop-smoking programs and medicines. These can increase your chances of quitting for good.   · If you are taking birth control pills or hormone therapy, talk to your doctor about whether they are triggering your migraines. When should you call for help? Call 911 anytime you think you may need emergency care. For example, call if:  ? · You have signs of a stroke. These may include:  ¨ Sudden numbness, paralysis, or weakness in your face, arm, or leg, especially on only one side of your body. ¨ Sudden vision changes. ¨ Sudden trouble speaking. ¨ Sudden confusion or trouble understanding simple statements. ¨ Sudden problems with walking or balance. ¨ A sudden, severe headache that is different from past headaches. ?Call your doctor now or seek immediate medical care if:  ? · You have new or worse nausea and vomiting. ? · You have a new or higher fever. ? · Your headache gets much worse. ? Watch closely for changes in your health, and be sure to contact your doctor if:  ? · You are not getting better after 2 days (48 hours). Where can you learn more? Go to http://delmi-jacobo.info/. Enter U254 in the search box to learn more about \"Migraine Headache: Care Instructions. \"  Current as of: October 14, 2016  Content Version: 11.4  © 5864-4225 [employer]. Care instructions adapted under license by [employer] (which disclaims liability or warranty for this information). If you have questions about a medical condition or this instruction, always ask your healthcare professional. [place] [address] any warranty or liability for your use of this information.

## 2018-05-27 LAB
ATRIAL RATE: 70 BPM
CALCULATED P AXIS, ECG09: 11 DEGREES
CALCULATED R AXIS, ECG10: 47 DEGREES
CALCULATED T AXIS, ECG11: 21 DEGREES
DIAGNOSIS, 93000: NORMAL
P-R INTERVAL, ECG05: 166 MS
Q-T INTERVAL, ECG07: 438 MS
QRS DURATION, ECG06: 90 MS
QTC CALCULATION (BEZET), ECG08: 473 MS
VENTRICULAR RATE, ECG03: 70 BPM

## 2019-02-27 ENCOUNTER — OFFICE VISIT (OUTPATIENT)
Dept: OBGYN CLINIC | Age: 44
End: 2019-02-27

## 2019-02-27 VITALS
HEIGHT: 69 IN | RESPIRATION RATE: 18 BRPM | DIASTOLIC BLOOD PRESSURE: 73 MMHG | HEART RATE: 77 BPM | SYSTOLIC BLOOD PRESSURE: 118 MMHG | WEIGHT: 231.2 LBS | TEMPERATURE: 97.9 F | BODY MASS INDEX: 34.24 KG/M2

## 2019-02-27 DIAGNOSIS — D25.9 UTERINE LEIOMYOMA, UNSPECIFIED LOCATION: ICD-10-CM

## 2019-02-27 DIAGNOSIS — R10.2 PELVIC PAIN: Primary | ICD-10-CM

## 2019-02-27 RX ORDER — HYDROCHLOROTHIAZIDE 12.5 MG/1
12.5 TABLET ORAL DAILY
COMMUNITY

## 2019-02-27 NOTE — PROGRESS NOTES
Name: Lizabeth Siddiqi MRN: [id number] No obstetric history on file. YOB: 1975  Age: 37 y.o. Sex: female Chief Complaint Patient presents with  Pelvic Pain  
  reports that she has 7 uterine fibroids on top of her uterus, heavy cycles which she has to get iron infusions for anemia, hx of uterine ablasion HPI 39 G0 LMP 2/3  With h/o multiple sclerosis (dx ), LLE DVT in  on Xarelto and fibroid uterus with h/o menorrhagia s/p endometrial ablation in , and anemia presents with 3 weeks of right pelvic pain. She states she notices a small painful pump in her right groin area while in the showers. She states her pain feels sharp menstrual cramps. Her pain is worsened if she touches the area, however wearing a girdle makes it betters. She denies nausea/ vomiting, however c/o cold sweats at night that started two months ago. Denies abnormal vaginal discharge Denies urinary symptoms, bit sometimes has urinary hesistancy, but believes 2/2 MS. She has h/o constipation and is currently on stool softeners. Last sexual intercourse 2 days ago with a new partner unprotected. Pt states she had an IVF filter placed in  because her mother  of DVT and stroke. It was removed in . She used to see a hematologist for anemia and occasionally get IV iron transfusions. Her last visit was 6-8months ago and her last IV iron transfusions was 1 yr ago OB History  Para Term  AB Living  
 0 0 0 0 0 0 SAB TAB Ectopic Molar Multiple Live Births  
 0 0 0 0 0 0 Obstetric Comments Patient's last menstrual period was 2019 (exact date). Periods regular, last 5  days, flow heavy, severe first 2 days dysmenorrhea History of sexually transmitted infections: none Last pap: unsure PGyn Social History Substance and Sexual Activity Sexual Activity Yes  Partners: Male  Birth control/protection: None Past Medical History: Diagnosis Date  Anemia [address]  Edema 2013  
 swelling in left leg  MS (multiple sclerosis) ([facility] [address] 02/2016 Past Surgical History:  
Procedure Laterality Date  HX GASTRIC BYPASS  2009  HX HYSTEROSCOPY WITH ENDOMETRIAL ABLATION  12/2017  IR REMOVE IVC FILTER W SI  2017 Allergies Allergen Reactions  Bactrim [Sulfamethoprim] Itching and Swelling  Iodine Itching and Swelling  Lovenox [Enoxaparin] Swelling  
  hematomas  Sulfa (Sulfonamide Antibiotics) Hives Current Outpatient Medications on File Prior to Visit Medication Sig Dispense Refill  rivaroxaban (XARELTO) 20 mg tab tablet Take  by mouth daily.  hydroCHLOROthiazide (HYDRODIURIL) 12.5 mg tablet Take 12.5 mg by mouth daily. No current facility-administered medications on file prior to visit. Social History Socioeconomic History  Marital status:  Spouse name: Not on file  Number of children: Not on file  Years of education: Not on file  Highest education level: Not on file Social Needs  Financial resource strain: Not on file  Food insecurity - worry: Not on file  Food insecurity - inability: Not on file  Transportation needs - medical: Not on file  Transportation needs - non-medical: Not on file Occupational History  Not on file Tobacco Use  Smoking status: Current Some Day Smoker  Smokeless tobacco: Never Used  Tobacco comment: 2-3 when drinking Substance and Sexual Activity  Alcohol use: Yes Alcohol/week: 6.6 oz Types: 6 Cans of beer, 5 Shots of liquor per week Frequency: Monthly or less  Drug use: Yes Types: Marijuana Comment: last used a month ago  Sexual activity: Yes  
  Partners: Male Birth control/protection: None Other Topics Concern  Not on file Social History Narrative  Not on file Family History Problem Relation Age of Onset  Deep Vein Thrombosis Mother  Hypertension Mother  Cancer Mother  Alcohol abuse Mother  Heart Disease Father  Asthma Father  Heart Failure Sister  Liver Disease Sister  Gout Sister  Asthma Sister  Diabetes Sister  No Known Problems Brother  Breast Cancer Sister 39  
 Diabetes Sister Review of Systems Constitutional: Negative. Eyes: Negative. Respiratory: Negative. Cardiovascular: Negative. Gastrointestinal: Negative. Genitourinary: Negative. Musculoskeletal: Negative. Neurological: Negative. Endo/Heme/Allergies: Negative. Psychiatric/Behavioral: Negative. Visit Vitals /73 (BP 1 Location: Right arm, BP Patient Position: Sitting) Pulse 77 Temp 97.9 °F (36.6 °C) (Oral) Resp 18 Ht 5' 8.5\" (1.74 m) Wt 231 lb 3.2 oz (104.9 kg) LMP 02/03/2019 (Exact Date) BMI 34.64 kg/m² GENERAL:  Well developed, well nourished, in no distress NEURO/PSYCHE: Grossly intact, normal mood and affect HEENT: Normal cephalic, atraumatic, good dentition, neck supple. No thyromegaly ABDOMEN: + BS, soft with mild midline lower abdominal  tenderness, no guarding, rebound or masses EXTREMITIES: no edema or erythema noted SKIN:  Warm, dry, no lesions LYMPHATICS: No supraclavicular or 1cm mobile  tender lymph node palpated in her right groin. PELVIC EXAM: 
LABIA MAJORA: no masses, tenderness or lesions LABIA MINORA: no masses, tenderness or lesions CLITORIS: no masses, tenderness or lesions URETHRA: normal appearing, no masses or tenderness BLADDER: no fullness or tenderness VAGINA: pink appearing vagina with thick yellow discharge with mild odor, no lesions PERINEUM: no masses, tenderness or lesions CERVIX: No CMT or lesions UTERUS: small, mobile, mild uterine tenderness ADNEXA: nontender and no masses No results found for this or any previous visit (from the past 24 hour(s)). 1. Pelvic pain Reviewed possible etiologies including infection. Her pain may be also 2/2 her fibroid uterus. Discussed exam findings, discussed possible etiologies for tender lymph node including infection. Pt expressed understanding. Answered all of her questions. nuswab sent. Encouraged condom use all the time. Will also evaluate fibroid uterus via TVUS  
- NUSWAB VAGINITIS PLUS (VG+); Future - US TRANSVAGINAL; Future - US PELV NON OBS  LTD; Future 
- NUSWAB VAGINITIS PLUS (VG+) 2. Uterine leiomyoma, unspecified location See above - US TRANSVAGINAL; Future - US PELV NON OBS  LTD; Future F/U 2-3 weeks

## 2019-03-01 LAB
BACTERIAL VAGINOSIS, NAA: NEGATIVE
CANDIDA ALBICANS, NAA, 180056: POSITIVE
CANDIDA GLABRATA, NAA, 180057: NEGATIVE
CANDIDA KRUSEI, NAA, 180016: NEGATIVE
CHLAMYDIA TRACHOMATIS, NAA, 180097: NEGATIVE
NEISSERIA GONORRHOEAE, NAA, 180104: NEGATIVE
TRICH VAG BY NAA, 180087: NEGATIVE

## 2019-03-04 ENCOUNTER — TELEPHONE (OUTPATIENT)
Dept: OBGYN CLINIC | Age: 44
End: 2019-03-04

## 2019-03-04 NOTE — PROGRESS NOTES
Please let patient know that her vaginal swab showed she has a yeast infection. rx sent to her pharmacy.

## 2019-03-04 NOTE — TELEPHONE ENCOUNTER
----- Message from Mindi Dunn MD sent at 3/3/2019  8:55 PM EST -----  Please let patient know that her vaginal swab showed she has a yeast infection. rx sent to her pharmacy.

## 2019-03-06 DIAGNOSIS — B37.31 VULVOVAGINITIS DUE TO YEAST: Primary | ICD-10-CM

## 2019-03-06 RX ORDER — FLUCONAZOLE 150 MG/1
150 TABLET ORAL ONCE
Qty: 1 TAB | Refills: 10 | Status: SHIPPED | OUTPATIENT
Start: 2019-03-06 | End: 2019-03-06

## 2019-03-06 NOTE — TELEPHONE ENCOUNTER
Call made to the pt using two identifiers,name and . Pt made aware that her vaginal swab was positive for yeast and the provider will send Rx to pharmacy but none noted in the chart. Pt confirmed and stated she would like it to go to [facility] at  [facility] Dr. Eligio Kauffman forwarded to the provider.

## 2019-03-11 ENCOUNTER — HOSPITAL ENCOUNTER (EMERGENCY)
Age: 44
Discharge: HOME | End: 2019-03-11
Attending: EMERGENCY MEDICINE
Payer: MEDICAID

## 2019-03-11 ENCOUNTER — APPOINTMENT (OUTPATIENT)
Dept: CT IMAGING | Age: 44
End: 2019-03-11
Attending: EMERGENCY MEDICINE
Payer: MEDICAID

## 2019-03-11 VITALS
SYSTOLIC BLOOD PRESSURE: 137 MMHG | TEMPERATURE: 98.2 F | DIASTOLIC BLOOD PRESSURE: 99 MMHG | RESPIRATION RATE: 16 BRPM | OXYGEN SATURATION: 99 % | HEART RATE: 100 BPM

## 2019-03-11 DIAGNOSIS — S00.83XA CONTUSION OF FACE, INITIAL ENCOUNTER: Primary | ICD-10-CM
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
## 2019-03-11 DIAGNOSIS — G35 HISTORY OF MULTIPLE SCLEROSIS (HCC): ICD-10-CM

## 2019-03-11 LAB
ANION GAP SERPL CALC-SCNC: 9 MMOL/L (ref 3–18)
APTT PPP: 27.1 SEC (ref 23–36.4)
BASOPHILS # BLD: 0 K/UL (ref 0–0.1)
BASOPHILS NFR BLD: 1 % (ref 0–2)
BUN SERPL-MCNC: 7 MG/DL (ref 7–18)
BUN/CREAT SERPL: 10 (ref 12–20)
CALCIUM SERPL-MCNC: 8.3 MG/DL (ref 8.5–10.1)
CHLORIDE SERPL-SCNC: 103 MMOL/L (ref 100–108)
CO2 SERPL-SCNC: 26 MMOL/L (ref 21–32)
CREAT SERPL-MCNC: 0.7 MG/DL (ref 0.6–1.3)
DIFFERENTIAL METHOD BLD: ABNORMAL
EOSINOPHIL # BLD: 0.1 K/UL (ref 0–0.4)
EOSINOPHIL NFR BLD: 1 % (ref 0–5)
ERYTHROCYTE [DISTWIDTH] IN BLOOD BY AUTOMATED COUNT: 18.5 % (ref 11.6–14.5)
GLUCOSE SERPL-MCNC: 50 MG/DL (ref 74–99)
HCT VFR BLD AUTO: 32.4 % (ref 35–45)
HGB BLD-MCNC: 9.5 G/DL (ref 12–16)
INR PPP: 1 (ref 0.8–1.2)
LYMPHOCYTES # BLD: 0.9 K/UL (ref 0.9–3.6)
LYMPHOCYTES NFR BLD: 19 % (ref 21–52)
MCH RBC QN AUTO: 21.6 PG (ref 24–34)
MCHC RBC AUTO-ENTMCNC: 29.3 G/DL (ref 31–37)
MCV RBC AUTO: 73.6 FL (ref 74–97)
MONOCYTES # BLD: 0.5 K/UL (ref 0.05–1.2)
MONOCYTES NFR BLD: 10 % (ref 3–10)
NEUTS SEG # BLD: 3.2 K/UL (ref 1.8–8)
NEUTS SEG NFR BLD: 69 % (ref 40–73)
PLATELET # BLD AUTO: 290 K/UL (ref 135–420)
PMV BLD AUTO: 9 FL (ref 9.2–11.8)
POTASSIUM SERPL-SCNC: 4.6 MMOL/L (ref 3.5–5.5)
PROTHROMBIN TIME: 12.6 SEC (ref 11.5–15.2)
RBC # BLD AUTO: 4.4 M/UL (ref 4.2–5.3)
SODIUM SERPL-SCNC: 138 MMOL/L (ref 136–145)
WBC # BLD AUTO: 4.6 K/UL (ref 4.6–13.2)

## 2019-03-11 PROCEDURE — 85730 THROMBOPLASTIN TIME PARTIAL: CPT

## 2019-03-11 PROCEDURE — 70486 CT MAXILLOFACIAL W/O DYE: CPT

## 2019-03-11 PROCEDURE — 70450 CT HEAD/BRAIN W/O DYE: CPT

## 2019-03-11 PROCEDURE — 85610 PROTHROMBIN TIME: CPT

## 2019-03-11 PROCEDURE — 85025 COMPLETE CBC W/AUTO DIFF WBC: CPT

## 2019-03-11 PROCEDURE — 99284 EMERGENCY DEPT VISIT MOD MDM: CPT

## 2019-03-11 PROCEDURE — 80048 BASIC METABOLIC PNL TOTAL CA: CPT

## 2019-03-11 NOTE — PROGRESS NOTES
Spoke with pt at bedside. She states that she has a new PCP at [place], Ena Kraft. Chart updated.

## 2019-03-11 NOTE — DISCHARGE INSTRUCTIONS
SPECIFIC PATIENT INSTRUCTIONS FROM THE PHYSICIAN WHO TREATED YOU IN THE ER TODAY:  1. FOLLOW UP APPOINTMENT:  Your primary doctor in 1-2 days. 2. Return if any concerns or worsening condition(s). 3. Ice to contusion several times over the next 24 hours. 4.  Over-the-counter Tylenol for pain. Patient Education        Head Injury: Care Instructions  Your Care Instructions    Most injuries to the head are minor. Bumps, cuts, and scrapes on the head and face usually heal well and can be treated the same as injuries to other parts of the body. Although it's rare, once in a while a more serious problem shows up after you are home. So it's good to be on the lookout for symptoms for a day or two. Follow-up care is a key part of your treatment and safety. Be sure to make and go to all appointments, and call your doctor if you are having problems. It's also a good idea to know your test results and keep a list of the medicines you take. How can you care for yourself at home? · Follow your doctor's instructions. He or she will tell you if you need someone to watch you closely for the next 24 hours or longer. · Take it easy for the next few days or more if you are not feeling well. · Ask your doctor when it's okay for you to go back to activities like driving a car, riding a bike, or operating machinery. When should you call for help? Call 911 anytime you think you may need emergency care.  For example, call if:    · You have a seizure.     · You passed out (lost consciousness).     · You are confused or can't stay awake.    Call your doctor now or seek immediate medical care if:    · You have new or worse vomiting.     · You feel less alert.     · You have new weakness or numbness in any part of your body.    Watch closely for changes in your health, and be sure to contact your doctor if:    · You do not get better as expected.     · You have new symptoms, such as headaches, trouble concentrating, or changes in mood. Where can you learn more? Go to http://delmi-jacobo.info/. Enter E397 in the search box to learn more about \"Head Injury: Care Instructions. \"  Current as of: Cinthia 3, 2018  Content Version: 11.9  © 7987-3822 [employer] Blender. Care instructions adapted under license by ChoiceMap (which disclaims liability or warranty for this information). If you have questions about a medical condition or this instruction, always ask your healthcare professional. [address] any warranty or liability for your use of this information. Impact Drivenhart Activation    Thank you for requesting access to Keecker. Please follow the instructions below to securely access and download your online medical record. Keecker allows you to send messages to your doctor, view your test results, renew your prescriptions, schedule appointments, and more. How Do I Sign Up? 1. In your internet browser, go to [email or web address]. 2. Click on the First Time User? Click Here link in the Sign In box. You will see the New Member Sign Up page. 3. Enter your Keecker Access Code exactly as it appears below. You will not need to use this code after youve completed the sign-up process. If you do not sign up before the expiration date, you must request a new code. Keecker Access Code: [email or web address]  Expires: 2019  2:29 PM (This is the date your Keecker access code will )    4. Enter the last four digits of your Social Security Number (xxxx) and Date of Birth (mm/dd/yyyy) as indicated and click Submit. You will be taken to the next sign-up page. 5. Create a Keecker ID. This will be your Keecker login ID and cannot be changed, so think of one that is secure and easy to remember. 6. Create a Keecker password. You can change your password at any time. 7. Enter your Password Reset Question and Answer.  This can be used at a later time if you forget your password. 8. Enter your e-mail address. You will receive e-mail notification when new information is available in [address] 9. Click Sign Up. You can now view and download portions of your medical record. 10. Click the Download Summary menu link to download a portable copy of your medical information. Additional Information    If you have questions, please visit the Frequently Asked Questions section of the Rise website at [email or web address] Remember, Rise is NOT to be used for urgent needs. For medical emergencies, dial 911.

## 2019-05-01 ENCOUNTER — TELEPHONE (OUTPATIENT)
Dept: OBGYN CLINIC | Age: 44
End: 2019-05-01

## 2019-05-01 NOTE — TELEPHONE ENCOUNTER
Received call from scheduling requesting clarification of Imaging orders. 2 ultrasound orders were placed, abdominal pevic and transvaginal pelvic. Order changed to Non ob ultrasound pelvic with TV which will include both orders.

## 2019-05-07 ENCOUNTER — HOSPITAL ENCOUNTER (EMERGENCY)
Age: 44
Discharge: HOME | End: 2019-05-08
Attending: EMERGENCY MEDICINE | Admitting: EMERGENCY MEDICINE
Payer: MEDICAID

## 2019-05-07 VITALS
RESPIRATION RATE: 19 BRPM | HEIGHT: 69 IN | DIASTOLIC BLOOD PRESSURE: 94 MMHG | SYSTOLIC BLOOD PRESSURE: 154 MMHG | WEIGHT: 225 LBS | OXYGEN SATURATION: 100 % | TEMPERATURE: 98.4 F | BODY MASS INDEX: 33.33 KG/M2 | HEART RATE: 102 BPM

## 2019-05-07 DIAGNOSIS — M62.838 MUSCLE SPASMS OF BOTH LOWER EXTREMITIES: ICD-10-CM

## 2019-05-07 DIAGNOSIS — E87.1 HYPONATREMIA: Primary | ICD-10-CM

## 2019-05-07 LAB
ALBUMIN SERPL-MCNC: 4.1 G/DL (ref 3.4–5)
ALBUMIN/GLOB SERPL: 0.9 {RATIO} (ref 0.8–1.7)
ALP SERPL-CCNC: 115 U/L (ref 45–117)
ALT SERPL-CCNC: 37 U/L (ref 13–56)
ANION GAP SERPL CALC-SCNC: 14 MMOL/L (ref 3–18)
APPEARANCE UR: CLEAR
AST SERPL-CCNC: 37 U/L (ref 15–37)
BACTERIA URNS QL MICRO: ABNORMAL /HPF
BASOPHILS # BLD: 0 K/UL (ref 0–0.1)
BASOPHILS NFR BLD: 1 % (ref 0–2)
BILIRUB SERPL-MCNC: 0.5 MG/DL (ref 0.2–1)
BILIRUB UR QL: NEGATIVE
BUN SERPL-MCNC: 5 MG/DL (ref 7–18)
BUN/CREAT SERPL: 7 (ref 12–20)
CALCIUM SERPL-MCNC: 9.5 MG/DL (ref 8.5–10.1)
CHLORIDE SERPL-SCNC: 97 MMOL/L (ref 100–108)
CK SERPL-CCNC: 80 U/L (ref 26–192)
CO2 SERPL-SCNC: 20 MMOL/L (ref 21–32)
COLOR UR: YELLOW
CREAT SERPL-MCNC: 0.71 MG/DL (ref 0.6–1.3)
DIFFERENTIAL METHOD BLD: ABNORMAL
EOSINOPHIL # BLD: 0 K/UL (ref 0–0.4)
EOSINOPHIL NFR BLD: 1 % (ref 0–5)
EPITH CASTS URNS QL MICRO: ABNORMAL /LPF (ref 0–5)
ERYTHROCYTE [DISTWIDTH] IN BLOOD BY AUTOMATED COUNT: 20.2 % (ref 11.6–14.5)
GLOBULIN SER CALC-MCNC: 4.7 G/DL (ref 2–4)
GLUCOSE SERPL-MCNC: 112 MG/DL (ref 74–99)
GLUCOSE UR STRIP.AUTO-MCNC: NEGATIVE MG/DL
HCT VFR BLD AUTO: 35.9 % (ref 35–45)
HGB BLD-MCNC: 10.5 G/DL (ref 12–16)
HGB UR QL STRIP: ABNORMAL
KETONES UR QL STRIP.AUTO: 15 MG/DL
LEUKOCYTE ESTERASE UR QL STRIP.AUTO: ABNORMAL
LYMPHOCYTES # BLD: 1.1 K/UL (ref 0.9–3.6)
LYMPHOCYTES NFR BLD: 18 % (ref 21–52)
MAGNESIUM SERPL-MCNC: 2 MG/DL (ref 1.6–2.6)
MCH RBC QN AUTO: 20.9 PG (ref 24–34)
MCHC RBC AUTO-ENTMCNC: 29.2 G/DL (ref 31–37)
MCV RBC AUTO: 71.4 FL (ref 74–97)
MONOCYTES # BLD: 0.6 K/UL (ref 0.05–1.2)
MONOCYTES NFR BLD: 9 % (ref 3–10)
NEUTS SEG # BLD: 4.4 K/UL (ref 1.8–8)
NEUTS SEG NFR BLD: 71 % (ref 40–73)
NITRITE UR QL STRIP.AUTO: NEGATIVE
PH UR STRIP: 7 [PH] (ref 5–8)
PLATELET # BLD AUTO: 466 K/UL (ref 135–420)
PMV BLD AUTO: 9.2 FL (ref 9.2–11.8)
POTASSIUM SERPL-SCNC: 4.3 MMOL/L (ref 3.5–5.5)
PROT SERPL-MCNC: 8.8 G/DL (ref 6.4–8.2)
PROT UR STRIP-MCNC: 30 MG/DL
RBC # BLD AUTO: 5.03 M/UL (ref 4.2–5.3)
RBC #/AREA URNS HPF: ABNORMAL /HPF (ref 0–5)
SODIUM SERPL-SCNC: 131 MMOL/L (ref 136–145)
SP GR UR REFRACTOMETRY: 1.02 (ref 1–1.03)
UROBILINOGEN UR QL STRIP.AUTO: 1 EU/DL (ref 0.2–1)
WBC # BLD AUTO: 6.2 K/UL (ref 4.6–13.2)
WBC URNS QL MICRO: ABNORMAL /HPF (ref 0–4)

## 2019-05-07 PROCEDURE — 74011250636 HC RX REV CODE- 250/636: Performed by: PHYSICIAN ASSISTANT

## 2019-05-07 PROCEDURE — 87077 CULTURE AEROBIC IDENTIFY: CPT

## 2019-05-07 PROCEDURE — 83735 ASSAY OF MAGNESIUM: CPT

## 2019-05-07 PROCEDURE — 80053 COMPREHEN METABOLIC PANEL: CPT

## 2019-05-07 PROCEDURE — 96361 HYDRATE IV INFUSION ADD-ON: CPT

## 2019-05-07 PROCEDURE — 82550 ASSAY OF CK (CPK): CPT

## 2019-05-07 PROCEDURE — 81001 URINALYSIS AUTO W/SCOPE: CPT

## 2019-05-07 PROCEDURE — 74011250637 HC RX REV CODE- 250/637: Performed by: PHYSICIAN ASSISTANT

## 2019-05-07 PROCEDURE — 96375 TX/PRO/DX INJ NEW DRUG ADDON: CPT

## 2019-05-07 PROCEDURE — 87186 SC STD MICRODIL/AGAR DIL: CPT

## 2019-05-07 PROCEDURE — 85025 COMPLETE CBC W/AUTO DIFF WBC: CPT

## 2019-05-07 PROCEDURE — 96374 THER/PROPH/DIAG INJ IV PUSH: CPT

## 2019-05-07 PROCEDURE — 99285 EMERGENCY DEPT VISIT HI MDM: CPT

## 2019-05-07 PROCEDURE — 87086 URINE CULTURE/COLONY COUNT: CPT

## 2019-05-07 RX ORDER — MORPHINE SULFATE 4 MG/ML
4 INJECTION INTRAVENOUS
Status: COMPLETED | OUTPATIENT
Start: 2019-05-07 | End: 2019-05-07

## 2019-05-07 RX ORDER — LORAZEPAM 2 MG/ML
1 INJECTION INTRAMUSCULAR
Status: COMPLETED | OUTPATIENT
Start: 2019-05-07 | End: 2019-05-07

## 2019-05-07 RX ORDER — DIAZEPAM 5 MG/1
5 TABLET ORAL
Status: COMPLETED | OUTPATIENT
Start: 2019-05-07 | End: 2019-05-07

## 2019-05-07 RX ORDER — CYCLOBENZAPRINE HCL 10 MG
10 TABLET ORAL
COMMUNITY

## 2019-05-07 RX ORDER — ONDANSETRON 2 MG/ML
4 INJECTION INTRAMUSCULAR; INTRAVENOUS
Status: COMPLETED | OUTPATIENT
Start: 2019-05-07 | End: 2019-05-07

## 2019-05-07 RX ORDER — ONDANSETRON 4 MG/1
4 TABLET, ORALLY DISINTEGRATING ORAL
Qty: 15 TAB | Refills: 0 | Status: SHIPPED | OUTPATIENT
Start: 2019-05-07

## 2019-05-07 RX ORDER — DIAZEPAM 10 MG/2ML
5 INJECTION INTRAMUSCULAR
Status: COMPLETED | OUTPATIENT
Start: 2019-05-07 | End: 2019-05-07

## 2019-05-07 RX ADMIN — DIAZEPAM 5 MG: 5 TABLET ORAL at 19:11

## 2019-05-07 RX ADMIN — SODIUM CHLORIDE 1000 ML: 900 INJECTION, SOLUTION INTRAVENOUS at 21:01

## 2019-05-07 RX ADMIN — SODIUM CHLORIDE 1000 ML: 900 INJECTION, SOLUTION INTRAVENOUS at 22:19

## 2019-05-07 RX ADMIN — ONDANSETRON 4 MG: 2 INJECTION INTRAMUSCULAR; INTRAVENOUS at 19:28

## 2019-05-07 RX ADMIN — MORPHINE SULFATE 4 MG: 4 INJECTION INTRAVENOUS at 20:36

## 2019-05-07 RX ADMIN — DIAZEPAM 5 MG: 5 INJECTION, SOLUTION INTRAMUSCULAR; INTRAVENOUS at 22:19

## 2019-05-07 RX ADMIN — LORAZEPAM 1 MG: 2 INJECTION, SOLUTION INTRAMUSCULAR; INTRAVENOUS at 20:59

## 2019-05-07 NOTE — ED TRIAGE NOTES
Pt arrived via EMS with c/o MS flare up, has seen her neurologist recently who prescribed Flexeril but she has not gotten it yet. Pt with pain 10/10 bilateral legs.

## 2019-05-07 NOTE — ED PROVIDER NOTES
EMERGENCY DEPARTMENT HISTORY AND PHYSICAL EXAM 
 
Date: 5/7/2019 Patient Name: Alisa Gonzales History of Presenting Illness Chief Complaint Patient presents with  Multiple Sclerosis  
  flare up History Provided By: Patient Chief Complaint: Multiple Sclerosis exacerbation Duration: 12 Hours Timing:  Worsening Location: back and chest pain radiating to bilateral lower extremities Quality: spasm Severity: 10 out of 10 Modifying Factors:  
Associated Symptoms: nausea, vomiting, headache, urinary incontinence Additional History (Context): Alisa Gonzales is a 53 y.o. female with a PMH of Multiple Sclerosis and DVT/PE who presents with acutely worsening bilateral lower extremity pain and R foot paresthesias with associated headaches, nausea/vomiting, and urinary incontinence that she describes as \"not being able to hold the pee\" that began this am and has progressed to full body spasms that caused her to fall this morning. She denies loss of consciousness or head trauma. She reports that yesterday she saw her neurologist Dr. Christo Cadet who prescribed her Flexeril but denies getting it filled. She has discontinued her Baclofen. She also reports a hx of intermittent chronic constipation. No other complaints at this time. PCP: Unknown, Provider Current Facility-Administered Medications Medication Dose Route Frequency Provider Last Rate Last Dose  ondansetron (ZOFRAN ODT) tablet 4 mg  4 mg Oral Q8H PRN Kristi Godinez PA-C   4 mg at 05/08/19 0006 Current Outpatient Medications Medication Sig Dispense Refill  ondansetron (ZOFRAN ODT) 4 mg disintegrating tablet Take 1 Tab by mouth every eight (8) hours as needed for Nausea. 15 Tab 0  
 rivaroxaban (XARELTO) 20 mg tab tablet Take  by mouth daily.  hydroCHLOROthiazide (HYDRODIURIL) 12.5 mg tablet Take 12.5 mg by mouth daily.     
 cyclobenzaprine (FLEXERIL) 10 mg tablet Take 10 mg by mouth three (3) times daily as needed for Muscle Spasm(s).  baclofen (LIORESAL) 20 mg tablet Take 20 mg by mouth three (3) times daily. Past History Past Medical History: 
Past Medical History:  
Diagnosis Date  Acid reflux  Anemia [address]  Edema 2013  
 swelling in left leg  Migraine  MS (multiple sclerosis) ([facility] [address] 02/2016 Past Surgical History: 
Past Surgical History:  
Procedure Laterality Date  HX GASTRIC BYPASS  2009  HX HYSTEROSCOPY WITH ENDOMETRIAL ABLATION  12/2017  IR REMOVE IVC FILTER W SI 2017 Family History: 
Family History Problem Relation Age of Onset  Deep Vein Thrombosis Mother  Hypertension Mother  Cancer Mother  Alcohol abuse Mother  Heart Disease Father  Asthma Father  Heart Failure Sister  Liver Disease Sister  Gout Sister  Asthma Sister  Diabetes Sister  No Known Problems Brother  Breast Cancer Sister 39  
 Diabetes Sister Social History: 
Social History Tobacco Use  Smoking status: Current Some Day Smoker  Smokeless tobacco: Never Used  Tobacco comment: 2-3 when drinking  usually cigars Substance Use Topics  Alcohol use: Yes Alcohol/week: 6.6 oz Types: 6 Cans of beer, 5 Shots of liquor per week Frequency: Monthly or less  Drug use: Yes Types: Marijuana Comment: last used a month ago Allergies: Allergies Allergen Reactions  Sulfa (Sulfonamide Antibiotics) Anaphylaxis  Ambien [Zolpidem] Other (comments) Sleep walking  Bactrim [Sulfamethoprim] Itching and Swelling  Iodine Hives  Iodine Itching and Swelling  Lovenox [Enoxaparin] Swelling  
  hematomas  Sulfa (Sulfonamide Antibiotics) Hives Review of Systems Review of Systems Constitutional: Negative for chills and fever. HENT: Negative for congestion, ear pain and rhinorrhea. Eyes: Negative for pain. Respiratory: Negative for cough, shortness of breath and wheezing. Gastrointestinal: Positive for constipation, nausea and vomiting. Negative for abdominal pain. Genitourinary: Positive for urgency. Musculoskeletal: Positive for back pain. Skin: Negative for rash and wound. Neurological: Positive for numbness and headaches. All Other Systems Negative Physical Exam  
 
Vitals:  
 05/07/19 2131 05/07/19 2200 05/07/19 2230 05/07/19 2300 BP:  149/84 146/89 (!) 154/94 Pulse:      
Resp:      
Temp:      
SpO2: 100% 100% 100% 100% Weight:      
Height:      
 
Physical Exam  
Constitutional: She is oriented to person, place, and time. She appears well-developed and well-nourished. She appears distressed. Moderately distressed HENT:  
Head: Normocephalic and atraumatic. Eyes: Pupils are equal, round, and reactive to light. Conjunctivae and EOM are normal. Right eye exhibits no discharge. Left eye exhibits no discharge. No scleral icterus. Neck: Normal range of motion. Neck supple. No meningeal signs or nuchal rigidity noted. Cardiovascular: Normal rate, regular rhythm, normal heart sounds and intact distal pulses. Exam reveals no gallop and no friction rub. No murmur heard. Pulmonary/Chest: Effort normal and breath sounds normal. No stridor. No respiratory distress. She has no wheezes. She has no rales. Abdominal: Soft. There is tenderness. Mild discomfort noted with palpation of the abdomen, no guarding or peritoneal signs noted. Musculoskeletal: Normal range of motion. She exhibits tenderness. She exhibits no edema or deformity. Diffuse TTP noted to the BLE, no LE edema or deformity noted. Full sensation and ROM noted to the BUE and BLE. Intact distal pulses. Neurological: She is alert and oriented to person, place, and time. Coordination normal.  
Gait is steady. Able to ambulate without difficulty. Skin: Skin is warm and dry. No rash noted. She is not diaphoretic. No erythema. Psychiatric: She has a normal mood and affect. Her behavior is normal. Thought content normal.  
Nursing note and vitals reviewed. Diagnostic Study Results Labs - Recent Results (from the past 12 hour(s)) URINALYSIS W/ RFLX MICROSCOPIC Collection Time: 05/07/19  7:00 PM  
Result Value Ref Range Color YELLOW Appearance CLEAR Specific gravity 1.016 1.005 - 1.030    
 pH (UA) 7.0 5.0 - 8.0 Protein 30 (A) NEG mg/dL Glucose NEGATIVE  NEG mg/dL Ketone 15 (A) NEG mg/dL Bilirubin NEGATIVE  NEG Blood TRACE (A) NEG Urobilinogen 1.0 0.2 - 1.0 EU/dL Nitrites NEGATIVE  NEG Leukocyte Esterase SMALL (A) NEG URINE MICROSCOPIC ONLY Collection Time: 05/07/19  7:00 PM  
Result Value Ref Range WBC 10 to 15 0 - 4 /hpf  
 RBC 3 to 5 0 - 5 /hpf Epithelial cells FEW 0 - 5 /lpf Bacteria FEW (A) NEG /hpf  
CBC WITH AUTOMATED DIFF Collection Time: 05/07/19  7:13 PM  
Result Value Ref Range WBC 6.2 4.6 - 13.2 K/uL  
 RBC 5.03 4.20 - 5.30 M/uL  
 HGB 10.5 (L) 12.0 - 16.0 g/dL HCT 35.9 35.0 - 45.0 % MCV 71.4 (L) 74.0 - 97.0 FL  
 MCH 20.9 (L) 24.0 - 34.0 PG  
 MCHC 29.2 (L) 31.0 - 37.0 g/dL RDW 20.2 (H) 11.6 - 14.5 % PLATELET 355 (H) 926 - 420 K/uL MPV 9.2 9.2 - 11.8 FL  
 NEUTROPHILS 71 40 - 73 % LYMPHOCYTES 18 (L) 21 - 52 % MONOCYTES 9 3 - 10 % EOSINOPHILS 1 0 - 5 % BASOPHILS 1 0 - 2 %  
 ABS. NEUTROPHILS 4.4 1.8 - 8.0 K/UL  
 ABS. LYMPHOCYTES 1.1 0.9 - 3.6 K/UL  
 ABS. MONOCYTES 0.6 0.05 - 1.2 K/UL  
 ABS. EOSINOPHILS 0.0 0.0 - 0.4 K/UL  
 ABS. BASOPHILS 0.0 0.0 - 0.1 K/UL  
 DF AUTOMATED METABOLIC PANEL, COMPREHENSIVE Collection Time: 05/07/19  7:13 PM  
Result Value Ref Range Sodium 131 (L) 136 - 145 mmol/L Potassium 4.3 3.5 - 5.5 mmol/L  Chloride 97 (L) 100 - 108 mmol/L  
 CO2 20 (L) 21 - 32 mmol/L  
 Anion gap 14 3.0 - 18 mmol/L Glucose 112 (H) 74 - 99 mg/dL BUN 5 (L) 7.0 - 18 MG/DL Creatinine 0.71 0.6 - 1.3 MG/DL  
 BUN/Creatinine ratio 7 (L) 12 - 20 GFR est AA >60 >60 ml/min/1.73m2 GFR est non-AA >60 >60 ml/min/1.73m2 Calcium 9.5 8.5 - 10.1 MG/DL Bilirubin, total 0.5 0.2 - 1.0 MG/DL  
 ALT (SGPT) 37 13 - 56 U/L  
 AST (SGOT) 37 15 - 37 U/L Alk. phosphatase 115 45 - 117 U/L Protein, total 8.8 (H) 6.4 - 8.2 g/dL Albumin 4.1 3.4 - 5.0 g/dL Globulin 4.7 (H) 2.0 - 4.0 g/dL A-G Ratio 0.9 0.8 - 1.7 MAGNESIUM Collection Time: 05/07/19  7:13 PM  
Result Value Ref Range Magnesium 2.0 1.6 - 2.6 mg/dL CK Collection Time: 05/07/19  7:13 PM  
Result Value Ref Range CK 80 26 - 192 U/L Radiologic Studies - No orders to display CT Results  (Last 48 hours) None CXR Results  (Last 48 hours) None Medical Decision Making I am the first provider for this patient. I reviewed the vital signs, available nursing notes, past medical history, past surgical history, family history and social history. Vital Signs-Reviewed the patient's vital signs. Records Reviewed:Kristi Louise PA-C Procedures: 
Procedures Provider Notes (Medical Decision Making): Impression:  MS, lower extremity pain, N/V, headache, hyponatremia IV inserted, saline, zofran, valium and morphine given, sx improved. Pt dehydrated and hyponatremia, otherwise labs are stable. No further ED work up required at this time. Will plan to d/c with zofran and PCP follow-up. Pt agrees. Kristi Godinez PA-C  
 
MED RECONCILIATION: 
Current Facility-Administered Medications Medication Dose Route Frequency  ondansetron (ZOFRAN ODT) tablet 4 mg  4 mg Oral Q8H PRN Current Outpatient Medications Medication Sig  
 ondansetron (ZOFRAN ODT) 4 mg disintegrating tablet Take 1 Tab by mouth every eight (8) hours as needed for Nausea.  rivaroxaban (XARELTO) 20 mg tab tablet Take  by mouth daily.  hydroCHLOROthiazide (HYDRODIURIL) 12.5 mg tablet Take 12.5 mg by mouth daily.  cyclobenzaprine (FLEXERIL) 10 mg tablet Take 10 mg by mouth three (3) times daily as needed for Muscle Spasm(s).  baclofen (LIORESAL) 20 mg tablet Take 20 mg by mouth three (3) times daily. Disposition: 
D.c DISCHARGE NOTE:  
Patient is stable for discharge at this time. Rx for zofran given. Rest and follow-up with PCP this week. Return to the ED immediately for any new or worsening sx. April DAMIEN Godinez PA-C 12:11 AM  
 
Follow-up Information Follow up With Specialties Details Why Contact Info  
 your primary care doctor   Schedule an appointment as soon as possible for a visit in 1 week [facility] EMERGENCY DEPT Emergency Medicine  As needed, If symptoms worsen [address] 
[phone] Current Discharge Medication List  
  
START taking these medications Details  
ondansetron (ZOFRAN ODT) 4 mg disintegrating tablet Take 1 Tab by mouth every eight (8) hours as needed for Nausea. Qty: 15 Tab, Refills: 0 Diagnosis Clinical Impression: 1. Hyponatremia 2. Muscle spasms of both lower extremities

## 2019-05-08 PROCEDURE — 74011250637 HC RX REV CODE- 250/637: Performed by: PHYSICIAN ASSISTANT

## 2019-05-08 RX ORDER — ONDANSETRON 4 MG/1
4 TABLET, ORALLY DISINTEGRATING ORAL
Status: DISCONTINUED | OUTPATIENT
Start: 2019-05-08 | End: 2019-05-08 | Stop reason: HOSPADM

## 2019-05-08 RX ADMIN — ONDANSETRON 4 MG: 4 TABLET, ORALLY DISINTEGRATING ORAL at 00:06

## 2019-05-08 NOTE — DISCHARGE INSTRUCTIONS
Patient Education        Muscle Cramps: Care Instructions  Your Care Instructions    A muscle cramp occurs when a muscle tightens up suddenly. A cramp often happens in the legs. A muscle cramp is also called a muscle spasm or a charley horse. Muscle cramps usually last less than a minute. However, the pain may last for several minutes. Leg cramps that occur at night may wake you up. Heavy exercise, dehydration, and being overweight can increase your risk of getting cramps. An imbalance of certain chemicals in your blood, called electrolytes, can also lead to muscle cramps. Pregnant women sometimes get muscle cramps during sleep. Muscle cramps can be treated by stretching and massaging the muscle. If cramps keep coming back, your doctor may prescribe medicine that relaxes your muscles. Follow-up care is a key part of your treatment and safety. Be sure to make and go to all appointments, and call your doctor if you are having problems. It's also a good idea to know your test results and keep a list of the medicines you take. How can you care for yourself at home? · Drink plenty of fluids to prevent dehydration. Choose water and other caffeine-free clear liquids until you feel better. If you have kidney, heart, or liver disease and have to limit fluids, talk with your doctor before you increase the amount of fluids you drink. · Stretch your muscles every day, especially before and after exercise and at bedtime. Regular stretching can relax your muscles and may prevent cramps. · Do not suddenly increase the amount of exercise you get. Increase your exercise a little each week. · When you get a cramp, stretch and massage the muscle. You can also take a warm shower or bath to relax the muscle. A heating pad placed on the muscle can also help. · Take a daily multivitamin supplement.   · Ask your doctor if you can take an over-the-counter pain medicine, such as acetaminophen (Tylenol), ibuprofen (Advil, Motrin), or naproxen (Aleve). Be safe with medicines. Read and follow all instructions on the label. When should you call for help? Watch closely for changes in your health, and be sure to contact your doctor if:    · You get muscle cramps often that do not go away after home treatment.     · Your muscle cramps often wake you up at night.     · You do not get better as expected. Where can you learn more? Go to http://delmi-jacobo.info/. Enter I258 in the search box to learn more about \"Muscle Cramps: Care Instructions. \"  Current as of: 2018  Content Version: 11.9  © 7613-5427 DeviceAuthority. Care instructions adapted under license by Holograam (which disclaims liability or warranty for this information). If you have questions about a medical condition or this instruction, always ask your healthcare professional. [place] [address] any warranty or liability for your use of this information. Ikonisys Activation    Thank you for requesting access to Ikonisys. Please follow the instructions below to securely access and download your online medical record. Ikonisys allows you to send messages to your doctor, view your test results, renew your prescriptions, schedule appointments, and more. How Do I Sign Up? 1. In your internet browser, go to [email or web address]  2. Click on the First Time User? Click Here link in the Sign In box. You will be redirect to the New Member Sign Up page. 3. Enter your Ikonisys Access Code exactly as it appears below. You will not need to use this code after youve completed the sign-up process. If you do not sign up before the expiration date, you must request a new code. Ikonisys Access Code: [facility]  Expires: 6/15/2019  9:13 AM (This is the date your Ikonisys access code will )    4.  Enter the last four digits of your Social Security Number (xxxx) and Date of Birth (mm/dd/yyyy) as indicated and click Submit. You will be taken to the next sign-up page. 5. Create a Acquiat ID. This will be your [employer]R [employer] login ID and cannot be changed, so think of one that is secure and easy to remember. 6. Create a [employer] password. You can change your password at any time. 7. Enter your Password Reset Question and Answer. This can be used at a later time if you forget your password. 8. Enter your e-mail address. You will receive e-mail notification when new information is available in [address]. 9. Click Sign Up. You can now view and download portions of your medical record. 10. Click the Download Summary menu link to download a portable copy of your medical information. Additional Information    If you have questions, please visit the Frequently Asked Questions section of the [employer] website at [email or web address] Remember, [employer] is NOT to be used for urgent needs. For medical emergencies, dial 911. Continue all medications as prescribed. Follow-up with primary care doctor in 1 week. Return to the ED immediately for any new or worsening symptoms.

## 2019-05-10 LAB
BACTERIA SPEC CULT: ABNORMAL
SERVICE CMNT-IMP: ABNORMAL

## 2021-12-09 NOTE — ED NOTES
End of shift report given to [facility].
I have reviewed discharge instructions with the patient. The patient verbalized understanding. Patient armband removed and shredded. 1 prescription given. Pt ambulated out of the ED accompanied by pt friends.
Pt screams for pain, Morphine 4mg administered. Pt stated it did not help and continues screams and cries. Pt holds her leg and saying cramping and says can't move. Provider assessed pt at the bedside. Ativan 1mg ordered and administered. Will continue monitor.
Pt stated pt having cramping not pain bilat legs, abd, back, bilat arms \"I am afraid to move them. \" provider notified.
Pt verbalized feeling better. Discharge ordered. Removed PIV, went over discharge instruction with Pt. Pt started belching. zofran ODT order received.
Have You Had Fillers Before?: has had fillers

## 2022-12-09 NOTE — PATIENT INSTRUCTIONS
Pre Op clearance form has been faxed to [phone] address to  [facility]  Phone: [phone]   Uterine Fibroids: Care Instructions Your Care Instructions Uterine fibroids are growths in the uterus. Fibroids aren't cancer. Doctors don't know what causes fibroids. Fibroids are very common in women during their childbearing years. Fibroids can grow on the inside of the uterus, in the muscle wall of the uterus, or near the outside wall of the uterus. In some women, fibroids cause painful cramps and heavy periods. In these cases, taking anti-inflammatory medicines, birth control pills, or using an intrauterine device (IUD) often helps decrease symptoms. Sometimes surgery is needed to treat fibroids. But if you are near menopause, you may want to wait and see if your symptoms get better. Most fibroids shrink and go away after menopause, when your menstrual periods stop completely. Follow-up care is a key part of your treatment and safety. Be sure to make and go to all appointments, and call your doctor if you are having problems. It's also a good idea to know your test results and keep a list of the medicines you take. How can you care for yourself at home? · If your doctor gave you medicine, take it as exactly as prescribed. Be safe with medicines. Call your doctor if you think you are having a problem with your medicine. · Take anti-inflammatory medicines for pain. These include ibuprofen (Advil, Motrin) and naproxen (Aleve). Read and follow all instructions on the label. · Use heat, such as a hot water bottle or a heating pad set on low, or a warm bath to relax tense muscles and relieve cramping. Put a thin cloth between the heating pad and your skin. Never go to sleep with a heating pad on. · Lie down and put a pillow under your knees. Or, lie on your side and bring your knees up to your chest. These positions may help relieve belly pain or pressure. · Keep track of how many sanitary pads or tampons you use each day. · Get at least 30 minutes of exercise on most days of the week.  Walking is a good choice. You also may want to do other activities, such as running, swimming, cycling, or playing tennis or team sports. · If you bleed longer than usual or have heavy bleeding, take a daily multivitamin with iron. When should you call for help? Call your doctor now or seek immediate medical care if: 
  · You have severe vaginal bleeding.  
  · You have new or worse belly or pelvic pain.  
 Watch closely for changes in your health, and be sure to contact your doctor if: 
  · You have unusual vaginal bleeding.  
  · You do not get better as expected. Where can you learn more? Go to http://delmi-jacobo.info/. Enter B121 in the search box to learn more about \"Uterine Fibroids: Care Instructions. \" Current as of: May 14, 2018 Content Version: 11.9 © 1863-3022 [employer]. Care instructions adapted under license by [employer] (which disclaims liability or warranty for this information). If you have questions about a medical condition or this instruction, always ask your healthcare professional. [place] [address] any warranty or liability for your use of this information. Pelvic Pain: Care Instructions Your Care Instructions Pelvic pain, or pain in the lower belly, can have many causes. Often pelvic pain is not serious and gets better in a few days. If your pain continues or gets worse, you may need tests and treatment. Tell your doctor about any new symptoms. These may be signs of a serious problem. Follow-up care is a key part of your treatment and safety. Be sure to make and go to all appointments, and call your doctor if you are having problems. It's also a good idea to know your test results and keep a list of the medicines you take. How can you care for yourself at home? · Rest until you feel better. Lie down, and raise your legs by placing a pillow under your knees. · Drink plenty of fluids. You may find that small, frequent sips are easier on your stomach than if you drink a lot at once. Avoid drinks with carbonation or caffeine, such as soda pop, tea, or coffee. · Try eating several small meals instead of 2 or 3 large ones. Eat mild foods, such as rice, dry toast or crackers, bananas, and applesauce. Avoid fatty and spicy foods, other fruits, and alcohol until 48 hours after your symptoms have gone away. · Take an over-the-counter pain medicine, such as acetaminophen (Tylenol), ibuprofen (Advil, Motrin), or naproxen (Aleve). Read and follow all instructions on the label. · Do not take two or more pain medicines at the same time unless the doctor told you to. Many pain medicines have acetaminophen, which is Tylenol. Too much acetaminophen (Tylenol) can be harmful. · You can put a heating pad, a warm cloth, or moist heat on your belly to relieve pain. When should you call for help? Call your doctor now or seek immediate medical care if: 
  · You have a new or higher fever.  
  · You have unusual vaginal bleeding.  
  · You have new or worse belly or pelvic pain.  
  · You have vaginal discharge that has increased in amount or smells bad.  
 Watch closely for changes in your health, and be sure to contact your doctor if: 
  · You do not get better as expected. Where can you learn more? Go to http://delmi-jacobo.info/. Enter [id number] in the search box to learn more about \"Pelvic Pain: Care Instructions. \" Current as of: May 14, 2018 Content Version: 11.9 © 5761-5146 [employer]. Care instructions adapted under license by [employer] (which disclaims liability or warranty for this information). If you have questions about a medical condition or this instruction, always ask your healthcare professional. [address] any warranty or liability for your use of this information.

## 2023-05-09 NOTE — ED TRIAGE NOTES
Left message to call back.   Per EMS-\"Patient states she has MS and states she fell out of bed last night and hit her head and complains of pain to her forehead, left knee, and left heel area. \"